# Patient Record
Sex: FEMALE | Race: WHITE | NOT HISPANIC OR LATINO | Employment: UNEMPLOYED | URBAN - METROPOLITAN AREA
[De-identification: names, ages, dates, MRNs, and addresses within clinical notes are randomized per-mention and may not be internally consistent; named-entity substitution may affect disease eponyms.]

---

## 2019-06-26 ENCOUNTER — APPOINTMENT (OUTPATIENT)
Dept: GENERAL RADIOLOGY | Facility: HOSPITAL | Age: 29
End: 2019-06-26

## 2019-06-26 ENCOUNTER — HOSPITAL ENCOUNTER (EMERGENCY)
Facility: HOSPITAL | Age: 29
Discharge: HOME | End: 2019-06-27
Attending: EMERGENCY MEDICINE | Admitting: EMERGENCY MEDICINE

## 2019-06-26 VITALS
TEMPERATURE: 98.3 F | SYSTOLIC BLOOD PRESSURE: 132 MMHG | OXYGEN SATURATION: 99 % | WEIGHT: 216.05 LBS | RESPIRATION RATE: 17 BRPM | DIASTOLIC BLOOD PRESSURE: 70 MMHG | HEART RATE: 76 BPM | HEIGHT: 67 IN | BODY MASS INDEX: 33.91 KG/M2

## 2019-06-26 DIAGNOSIS — R07.9 CHEST PAIN, UNSPECIFIED TYPE: Primary | ICD-10-CM

## 2019-06-26 LAB
ANION GAP SERPL CALCULATED.3IONS-SCNC: 12.5 MMOL/L (ref 10–20)
BASOPHILS # BLD AUTO: 0.1 10*3/MM3 (ref 0–0.2)
BASOPHILS NFR BLD AUTO: 0.9 % (ref 0–1.5)
BUN BLD-MCNC: 10 MG/DL (ref 8–20)
BUN/CREAT SERPL: 14.3 (ref 5.4–26.2)
CALCIUM SPEC-SCNC: 8.6 MG/DL (ref 8.9–10.3)
CHLORIDE SERPL-SCNC: 103 MMOL/L (ref 101–111)
CO2 SERPL-SCNC: 23 MMOL/L (ref 22–32)
CREAT BLD-MCNC: 0.7 MG/DL (ref 0.4–1)
DEPRECATED RDW RBC AUTO: 42.4 FL (ref 37–54)
EOSINOPHIL # BLD AUTO: 0.3 10*3/MM3 (ref 0–0.4)
EOSINOPHIL NFR BLD AUTO: 4.7 % (ref 0.3–6.2)
ERYTHROCYTE [DISTWIDTH] IN BLOOD BY AUTOMATED COUNT: 12.9 % (ref 12.3–15.4)
GFR SERPL CREATININE-BSD FRML MDRD: 98 ML/MIN/1.73
GLUCOSE BLD-MCNC: 85 MG/DL (ref 65–99)
HCT VFR BLD AUTO: 40.3 % (ref 34–46.6)
HGB BLD-MCNC: 13.6 G/DL (ref 12–15.9)
HOLD SPECIMEN: NORMAL
HOLD SPECIMEN: NORMAL
LYMPHOCYTES # BLD AUTO: 2.7 10*3/MM3 (ref 0.7–3.1)
LYMPHOCYTES NFR BLD AUTO: 37.7 % (ref 19.6–45.3)
MCH RBC QN AUTO: 31.6 PG (ref 26.6–33)
MCHC RBC AUTO-ENTMCNC: 33.7 G/DL (ref 31.5–35.7)
MCV RBC AUTO: 93.7 FL (ref 79–97)
MONOCYTES # BLD AUTO: 0.6 10*3/MM3 (ref 0.1–0.9)
MONOCYTES NFR BLD AUTO: 8.6 % (ref 5–12)
NEUTROPHILS # BLD AUTO: 3.5 10*3/MM3 (ref 1.7–7)
NEUTROPHILS NFR BLD AUTO: 48.1 % (ref 42.7–76)
NRBC BLD AUTO-RTO: 0.1 /100 WBC (ref 0–0.2)
PLATELET # BLD AUTO: 365 10*3/MM3 (ref 140–450)
PMV BLD AUTO: 8.9 FL (ref 6–12)
POTASSIUM BLD-SCNC: 3.5 MMOL/L (ref 3.6–5.1)
RBC # BLD AUTO: 4.3 10*6/MM3 (ref 3.77–5.28)
SODIUM BLD-SCNC: 135 MMOL/L (ref 136–144)
TROPONIN I SERPL-MCNC: <0.03 NG/ML (ref 0–0.03)
WBC NRBC COR # BLD: 7.3 10*3/MM3 (ref 3.4–10.8)
WHOLE BLOOD HOLD SPECIMEN: NORMAL
WHOLE BLOOD HOLD SPECIMEN: NORMAL

## 2019-06-26 PROCEDURE — 99284 EMERGENCY DEPT VISIT MOD MDM: CPT

## 2019-06-26 PROCEDURE — 84484 ASSAY OF TROPONIN QUANT: CPT | Performed by: EMERGENCY MEDICINE

## 2019-06-26 PROCEDURE — 93005 ELECTROCARDIOGRAM TRACING: CPT | Performed by: EMERGENCY MEDICINE

## 2019-06-26 PROCEDURE — 80048 BASIC METABOLIC PNL TOTAL CA: CPT | Performed by: EMERGENCY MEDICINE

## 2019-06-26 PROCEDURE — 71045 X-RAY EXAM CHEST 1 VIEW: CPT

## 2019-06-26 PROCEDURE — 85025 COMPLETE CBC W/AUTO DIFF WBC: CPT | Performed by: EMERGENCY MEDICINE

## 2019-06-26 RX ORDER — SODIUM CHLORIDE 0.9 % (FLUSH) 0.9 %
10 SYRINGE (ML) INJECTION AS NEEDED
Status: DISCONTINUED | OUTPATIENT
Start: 2019-06-26 | End: 2019-06-27 | Stop reason: HOSPADM

## 2019-08-07 PROCEDURE — 87086 URINE CULTURE/COLONY COUNT: CPT | Performed by: EMERGENCY MEDICINE

## 2019-08-09 ENCOUNTER — TELEPHONE (OUTPATIENT)
Dept: URGENT CARE | Facility: CLINIC | Age: 29
End: 2019-08-09

## 2019-08-09 NOTE — TELEPHONE ENCOUNTER
I left a message for the patient to call about the urine culture. Per Dr Rizo, the lab states the sample is growing multiple organisms and it was not a clean enough sample. It symptoms persist we will need her to come in to re-collect another sample for culture.

## 2019-08-09 NOTE — TELEPHONE ENCOUNTER
Patient returned call, informed of above. Pt states still with sx and will return today for additional culture.

## 2022-06-19 ENCOUNTER — HOSPITAL ENCOUNTER (EMERGENCY)
Facility: HOSPITAL | Age: 32
Discharge: HOME | End: 2022-06-19
Attending: EMERGENCY MEDICINE | Admitting: EMERGENCY MEDICINE

## 2022-06-19 VITALS
BODY MASS INDEX: 30.38 KG/M2 | HEART RATE: 80 BPM | DIASTOLIC BLOOD PRESSURE: 71 MMHG | OXYGEN SATURATION: 97 % | RESPIRATION RATE: 20 BRPM | TEMPERATURE: 97.7 F | HEIGHT: 67 IN | WEIGHT: 193.56 LBS | SYSTOLIC BLOOD PRESSURE: 126 MMHG

## 2022-06-19 DIAGNOSIS — F41.0 PANIC ATTACK: Primary | ICD-10-CM

## 2022-06-19 PROCEDURE — 99283 EMERGENCY DEPT VISIT LOW MDM: CPT

## 2022-06-19 PROCEDURE — 93005 ELECTROCARDIOGRAM TRACING: CPT | Performed by: EMERGENCY MEDICINE

## 2022-06-19 PROCEDURE — 93005 ELECTROCARDIOGRAM TRACING: CPT

## 2022-06-19 RX ORDER — LORAZEPAM 1 MG/1
1 TABLET ORAL ONCE
Status: COMPLETED | OUTPATIENT
Start: 2022-06-19 | End: 2022-06-19

## 2022-06-19 RX ADMIN — LORAZEPAM 1 MG: 1 TABLET ORAL at 12:47

## 2022-06-19 NOTE — ED PROVIDER NOTES
"Subjective   Chief complaint: Panic attack    32-year-old female presents stating she is having a panic attack.  Patient states symptoms started about 2 hours prior to arrival.  She states she was seen in the emergency room in [place] 4 days ago for the same complaint.  She had an extensive chest pain work-up at that time which was unremarkable.  Her symptoms improved with Ativan and she was discharged.  She states she has the same symptoms today.  She is having a tightness feeling in her chest.  She states she is tingling all over.  She feels like she cannot get a good breath.  Patient states she has a long history of anxiety and is on Zoloft, hydroxyzine, Xanax.  She also sees a counselor every other week.  She took her Xanax today with no improvement.  She denies any specific trigger.  She denies any alleviating or exacerbating factors.      History provided by:  Patient      Review of Systems   Constitutional: Negative for fever.   HENT: Negative for congestion and sore throat.    Eyes: Negative for redness.   Respiratory: Positive for shortness of breath. Negative for cough.    Cardiovascular: Positive for chest pain.   Gastrointestinal: Negative for abdominal pain, diarrhea and vomiting.   Genitourinary: Negative for dysuria.   Musculoskeletal: Negative for back pain.   Skin: Negative for rash.   Neurological: Negative for dizziness and headaches.   Psychiatric/Behavioral: Negative for confusion. The patient is nervous/anxious.        No past medical history on file.    No Known Allergies    No past surgical history on file.    No family history on file.    Social History     Socioeconomic History   • Marital status:        /71   Pulse 80   Temp 97.7 °F (36.5 °C) (Oral)   Resp 20   Ht 170.2 cm (67\")   Wt 87.8 kg (193 lb 9 oz)   LMP 06/19/2022   SpO2 97%   BMI 30.32 kg/m²       Objective   Physical Exam  Vitals and nursing note reviewed.   Constitutional:       Appearance: She is " well-developed.   HENT:      Head: Normocephalic and atraumatic.   Eyes:      Pupils: Pupils are equal, round, and reactive to light.   Cardiovascular:      Rate and Rhythm: Normal rate and regular rhythm.      Heart sounds: Normal heart sounds.   Pulmonary:      Effort: Pulmonary effort is normal. No respiratory distress.      Breath sounds: Normal breath sounds.   Abdominal:      General: Bowel sounds are normal.      Palpations: Abdomen is soft.      Tenderness: There is no abdominal tenderness.   Musculoskeletal:         General: Normal range of motion.      Cervical back: Normal range of motion and neck supple.   Skin:     General: Skin is warm and dry.   Neurological:      General: No focal deficit present.      Mental Status: She is alert and oriented to person, place, and time.   Psychiatric:         Mood and Affect: Mood is anxious.         Procedures           ED Course      My interpretation of EKG shows sinus rhythm, rate of 70, no ST elevation                                           MDM   Patient was given Ativan and she is feeling much better.  Her symptoms are completely gone.  I reviewed records from her recent ER visit in [place] 4 days ago.  Patient had an extensive work-up at that time which was unremarkable.  Patient feels the symptoms are due to anxiety and panic attacks and I agree with this.  I do not feel further work-up is warranted at this time and patient agrees.  She will be discharged and she will follow-up with her primary doctor for ongoing management of her anxiety.      Final diagnoses:   Panic attack       ED Disposition  ED Disposition     ED Disposition   Discharge    Condition   Stable    Comment   --             No follow-up provider specified.       Medication List      No changes were made to your prescriptions during this visit.          Harsha Chandler MD  06/19/22 7146

## 2022-06-20 LAB — QT INTERVAL: 385 MS

## 2024-07-12 ENCOUNTER — APPOINTMENT (OUTPATIENT)
Dept: GENERAL RADIOLOGY | Facility: HOSPITAL | Age: 34
End: 2024-07-12
Payer: MEDICAID

## 2024-07-12 ENCOUNTER — HOSPITAL ENCOUNTER (EMERGENCY)
Facility: HOSPITAL | Age: 34
Discharge: HOME | End: 2024-07-12
Payer: MEDICAID

## 2024-07-12 VITALS
RESPIRATION RATE: 18 BRPM | DIASTOLIC BLOOD PRESSURE: 80 MMHG | WEIGHT: 200 LBS | SYSTOLIC BLOOD PRESSURE: 132 MMHG | OXYGEN SATURATION: 95 % | TEMPERATURE: 98.2 F | BODY MASS INDEX: 31.39 KG/M2 | HEIGHT: 67 IN | HEART RATE: 95 BPM

## 2024-07-12 DIAGNOSIS — M25.561 ACUTE PAIN OF RIGHT KNEE: ICD-10-CM

## 2024-07-12 DIAGNOSIS — S83.91XA SPRAIN OF RIGHT KNEE, UNSPECIFIED LIGAMENT, INITIAL ENCOUNTER: Primary | ICD-10-CM

## 2024-07-12 PROCEDURE — 99283 EMERGENCY DEPT VISIT LOW MDM: CPT

## 2024-07-12 PROCEDURE — 73562 X-RAY EXAM OF KNEE 3: CPT

## 2024-07-12 RX ORDER — DICLOFENAC SODIUM 75 MG/1
75 TABLET, DELAYED RELEASE ORAL 2 TIMES DAILY PRN
Qty: 20 TABLET | Refills: 0 | Status: SHIPPED | OUTPATIENT
Start: 2024-07-12

## 2024-07-12 RX ORDER — ACETAMINOPHEN 500 MG
1000 TABLET ORAL ONCE
Status: COMPLETED | OUTPATIENT
Start: 2024-07-12 | End: 2024-07-12

## 2024-07-12 RX ORDER — IBUPROFEN 600 MG/1
600 TABLET ORAL ONCE
Status: COMPLETED | OUTPATIENT
Start: 2024-07-12 | End: 2024-07-12

## 2024-07-12 RX ADMIN — IBUPROFEN 600 MG: 600 TABLET, FILM COATED ORAL at 21:23

## 2024-07-12 RX ADMIN — ACETAMINOPHEN 1000 MG: 500 TABLET, FILM COATED ORAL at 21:23

## 2024-07-12 NOTE — Clinical Note
[facility] EMERGENCY DEPARTMENT  [address] [place] [address]  Phone: [phone]    Mechelle Pugh was seen and treated in our emergency department on 7/12/2024.  She may return to work on 07/14/2024.         Thank you for choosing [facility].    Ira Caldwell, APRN

## 2024-07-13 NOTE — DISCHARGE INSTRUCTIONS
Wear knee immobilizer and use crutches until pain-free if still painful in 10 days see orthopedics for further evaluation and treatment    Voltaren as needed for inflammation and pain do not mix with Motrin ibuprofen Advil or Aleve    You can use Tylenol with the Voltaren for pain control.

## 2024-07-13 NOTE — ED PROVIDER NOTES
Subjective   History of Present Illness  Patient is a 34-year-old obese female who was at a water park today and fell out of the tube and smacked her right knee on the water slide and is having pain since that time.  She states she placed ice and the pain became worse and she is unable to bear weight at this time.  She has no numbness no tingling she did not strike her head she did not lose consciousness.      Review of Systems   Musculoskeletal:  Positive for joint swelling.        Right knee pain       No past medical history on file.    Allergies   Allergen Reactions    Penicillins Other (See Comments)    Diazepam Other (See Comments)       No past surgical history on file.    No family history on file.    Social History     Socioeconomic History    Marital status:            Objective   Physical Exam  Vitals reviewed.   Constitutional:       Appearance: She is well-developed.   HENT:      Head: Normocephalic and atraumatic.   Eyes:      Conjunctiva/sclera: Conjunctivae normal.      Pupils: Pupils are equal, round, and reactive to light.   Cardiovascular:      Rate and Rhythm: Normal rate and regular rhythm.      Heart sounds: Normal heart sounds.   Pulmonary:      Effort: Pulmonary effort is normal.   Abdominal:      Palpations: Abdomen is soft.   Musculoskeletal:         General: No deformity. Normal range of motion.      Cervical back: Normal range of motion and neck supple.      Right knee: Swelling present. No erythema or ecchymosis. Decreased range of motion. Tenderness present.      Instability Tests: Anterior drawer test positive. Posterior drawer test positive.      Left knee: Normal.   Skin:     General: Skin is warm and dry.   Neurological:      General: No focal deficit present.      Mental Status: She is alert and oriented to person, place, and time.      GCS: GCS eye subscore is 4. GCS verbal subscore is 5. GCS motor subscore is 6.      Motor: No weakness.   Psychiatric:         Mood and  "Affect: Mood normal.         Behavior: Behavior normal.         Procedures       Patient was placed in a knee immobilizer and given crutches and crutch training by the nursing staff.  Patient was distally neurovascularly intact  ED Course                                   /80   Pulse 95   Temp 98.2 °F (36.8 °C)   Resp 18   Ht 170.2 cm (67\")   Wt 90.7 kg (200 lb)   SpO2 95%   BMI 31.32 kg/m²   Labs Reviewed - No data to display  Medications   acetaminophen (TYLENOL) tablet 1,000 mg (has no administration in time range)   ibuprofen (ADVIL,MOTRIN) tablet 600 mg (has no administration in time range)     XR Knee 3 View Right    Result Date: 7/12/2024  1.No evidence for displaced fracture or dislocation. Electronically Signed: Nimesh Martinez MD  7/12/2024 7:38 PM EDT  Workstation ID: [id number]             Medical Decision Making  Patient had above exam and x-ray was obtained and reviewed and interpreted by the radiologist and reviewed by myself as essentially normal with no fracture.  The patient was placed in a knee immobilizer and given crutches and crutch training by the nursing staff she was given some Tylenol and Motrin for pain here in the emergency room she was instructed on the use of Voltaren at home and to follow-up with orthopedics she states she has an appointment on the 17th she was encouraged to have them look at her knee as well she verbalized understood discharge instructions    Problems Addressed:  Acute pain of right knee: complicated acute illness or injury  Sprain of right knee, unspecified ligament, initial encounter: complicated acute illness or injury    Amount and/or Complexity of Data Reviewed  Radiology: independent interpretation performed. Decision-making details documented in ED Course.  ECG/medicine tests: ordered and independent interpretation performed. Decision-making details documented in ED Course.    Risk  OTC drugs.  Prescription drug management.        Final diagnoses: "   Sprain of right knee, unspecified ligament, initial encounter   Acute pain of right knee       ED Disposition  ED Disposition       ED Disposition   Discharge    Condition   Stable    Comment   --               Fredy Fuentes MD  [address]  [place] [address]  [phone]    In 3 days  If symptoms worsen, As needed    Cesar Churchill MD  [address] [place] [address]  [phone]    In 3 days  If symptoms worsen, As needed    Rishi Godinez II, MD  [address] [facility] [place] [address]  [phone]    In 3 days  If symptoms worsen, As needed         Medication List        New Prescriptions      diclofenac 75 MG EC tablet  Commonly known as: VOLTAREN  Take 1 tablet by mouth 2 (Two) Times a Day As Needed (pain).            Stop      nitrofurantoin (macrocrystal-monohydrate) 100 MG capsule  Commonly known as: MACROBID               Where to Get Your Medications        These medications were sent to [id number] - [place], [place] - [address] AT [facility] - [phone] [facility] [phone]   [address] [facility] [address]      Phone: [phone]   diclofenac 75 MG EC tablet            Ira Caldwell, APRN  07/12/24 4110

## 2024-12-09 ENCOUNTER — OFFICE VISIT (OUTPATIENT)
Dept: FAMILY MEDICINE CLINIC | Facility: CLINIC | Age: 34
End: 2024-12-09
Payer: MEDICAID

## 2024-12-09 VITALS
HEIGHT: 67 IN | OXYGEN SATURATION: 97 % | BODY MASS INDEX: 32.87 KG/M2 | DIASTOLIC BLOOD PRESSURE: 68 MMHG | WEIGHT: 209.4 LBS | SYSTOLIC BLOOD PRESSURE: 112 MMHG | HEART RATE: 110 BPM

## 2024-12-09 DIAGNOSIS — F17.200 CURRENT EVERY DAY SMOKER: ICD-10-CM

## 2024-12-09 DIAGNOSIS — M22.42 CHONDROMALACIA OF BOTH PATELLAE: ICD-10-CM

## 2024-12-09 DIAGNOSIS — Z23 NEED FOR COVID-19 VACCINE: ICD-10-CM

## 2024-12-09 DIAGNOSIS — Z23 NEED FOR INFLUENZA VACCINATION: ICD-10-CM

## 2024-12-09 DIAGNOSIS — A51.49 SECONDARY SYPHILIS: ICD-10-CM

## 2024-12-09 DIAGNOSIS — F33.1 MODERATE EPISODE OF RECURRENT MAJOR DEPRESSIVE DISORDER: ICD-10-CM

## 2024-12-09 DIAGNOSIS — Z71.6 ENCOUNTER FOR SMOKING CESSATION COUNSELING: ICD-10-CM

## 2024-12-09 DIAGNOSIS — E66.811 CLASS 1 OBESITY DUE TO EXCESS CALORIES WITH SERIOUS COMORBIDITY AND BODY MASS INDEX (BMI) OF 32.0 TO 32.9 IN ADULT: ICD-10-CM

## 2024-12-09 DIAGNOSIS — F43.10 PTSD (POST-TRAUMATIC STRESS DISORDER): ICD-10-CM

## 2024-12-09 DIAGNOSIS — Z76.89 ENCOUNTER TO ESTABLISH CARE: Primary | ICD-10-CM

## 2024-12-09 DIAGNOSIS — E66.09 CLASS 1 OBESITY DUE TO EXCESS CALORIES WITH SERIOUS COMORBIDITY AND BODY MASS INDEX (BMI) OF 32.0 TO 32.9 IN ADULT: ICD-10-CM

## 2024-12-09 DIAGNOSIS — F41.1 GAD (GENERALIZED ANXIETY DISORDER): ICD-10-CM

## 2024-12-09 DIAGNOSIS — M22.41 CHONDROMALACIA OF BOTH PATELLAE: ICD-10-CM

## 2024-12-09 DIAGNOSIS — Z22.7 TB LUNG, LATENT: ICD-10-CM

## 2024-12-09 DIAGNOSIS — B97.7 HPV IN FEMALE: ICD-10-CM

## 2024-12-09 PROBLEM — N83.209 CYST OF OVARY: Status: ACTIVE | Noted: 2022-06-23

## 2024-12-09 PROBLEM — F41.0 PANIC DISORDER: Status: ACTIVE | Noted: 2024-12-09

## 2024-12-09 PROBLEM — F41.0 PANIC DISORDER WITHOUT AGORAPHOBIA WITH SEVERE PANIC ATTACKS: Status: ACTIVE | Noted: 2021-12-26

## 2024-12-09 PROBLEM — F90.2 ATTENTION DEFICIT HYPERACTIVITY DISORDER, COMBINED TYPE: Status: ACTIVE | Noted: 2023-08-15

## 2024-12-09 PROBLEM — F31.12: Status: ACTIVE | Noted: 2023-08-15

## 2024-12-09 PROCEDURE — 90480 ADMN SARSCOV2 VAC 1/ONLY CMP: CPT

## 2024-12-09 PROCEDURE — 1160F RVW MEDS BY RX/DR IN RCRD: CPT

## 2024-12-09 PROCEDURE — 99204 OFFICE O/P NEW MOD 45 MIN: CPT

## 2024-12-09 PROCEDURE — 90471 IMMUNIZATION ADMIN: CPT

## 2024-12-09 PROCEDURE — 90656 IIV3 VACC NO PRSV 0.5 ML IM: CPT

## 2024-12-09 PROCEDURE — 91320 SARSCV2 VAC 30MCG TRS-SUC IM: CPT

## 2024-12-09 PROCEDURE — 1159F MED LIST DOCD IN RCRD: CPT

## 2024-12-09 RX ORDER — DESVENLAFAXINE 50 MG/1
100 TABLET, FILM COATED, EXTENDED RELEASE ORAL DAILY
COMMUNITY
End: 2024-12-09 | Stop reason: SDUPTHER

## 2024-12-09 RX ORDER — VARENICLINE TARTRATE 1 MG/1
1 TABLET, FILM COATED ORAL 2 TIMES DAILY
Qty: 56 TABLET | Refills: 1 | Status: SHIPPED | OUTPATIENT
Start: 2025-01-06 | End: 2024-12-13 | Stop reason: SDUPTHER

## 2024-12-09 RX ORDER — DESVENLAFAXINE 50 MG/1
100 TABLET, FILM COATED, EXTENDED RELEASE ORAL DAILY
Qty: 90 TABLET | Refills: 0 | Status: SHIPPED | OUTPATIENT
Start: 2024-12-09

## 2024-12-09 RX ORDER — NORGESTIMATE AND ETHINYL ESTRADIOL 7DAYSX3 28
1 KIT ORAL DAILY
COMMUNITY
Start: 2022-05-08

## 2024-12-09 NOTE — PATIENT INSTRUCTIONS

## 2024-12-09 NOTE — PROGRESS NOTES
Subjective   Mechelle Pugh is a 34 y.o. female.     Chief Complaint   Patient presents with    [facility]    referral to psych     History of Present Illness  The patient is a 34-year-old female who presents for evaluation of multiple medical concerns.    Anxiety, Depression, and Panic Disorder  She is currently on Pristiq for anxiety, depression, and severe panic disorder. Despite her efforts to adhere to the medication regimen, she occasionally misses doses due to technical issues. When off the medication for two days, she experiences lethargy and a lack of motivation. She describes her depression as mild to moderate, but her panic disorder is severe. She was previously diagnosed with ADHD and prescribed medication, but discontinued it due to side effects. She also has a history of bipolar disorder, which led to a period of suicidal ideation and a diagnosis at [facility] in 2023. She has been attending [facility] weekly for the past seven months and is now transitioning to bi-weekly visits. She has been off her medication for two weeks and is seeking a referral to a psychiatrist at [facility]. She reports feeling down, depressed, or hopeless more than half the time, sleeping excessively, feeling tired, having a poor appetite, and feeling like a failure. She does not endorse any thoughts of self-harm or suicide.  - Onset: When off medication for two days.  - Duration: Off medication for two weeks.  - Character: Lethargy, lack of motivation, mild to moderate depression, severe panic disorder.  - Alleviating/Aggravating Factors: Adherence to medication regimen, technical issues causing missed doses.  - Severity: Severe panic disorder, mild to moderate depression.    History of Ovarian Cysts and HPV  She has a history of ovarian cysts, one of which ruptured and required a D & C. She was diagnosed with HPV in her early 20s, but recent Pap smears have been normal. She  has never had a mammogram but is interested in getting one due to her family history of breast cancer. She has a fear of death, which she believes contributes to her severe pain disorder.  - Onset: Early 20s for HPV diagnosis.  - Character: Ovarian cysts, ruptured cyst requiring D & C, normal recent Pap smears.  - Severity: Fear of death contributing to severe pain disorder.    Smoking Cessation  She was previously on Chantix to quit smoking, which she found effective. However, she lost the medication and resumed smoking. She is interested in restarting Chantix as it helped her lose the desire to smoke.  - Onset: Resumed smoking after losing Chantix medication.  - Character: Effective smoking cessation with Chantix.  - Alleviating/Aggravating Factors: Loss of medication led to resumption of smoking.    Chondromalacia and Secondary Syphilis  She is taking birth control pills. She is not taking diclofenac. She has chondromalacia of both knees and was going to [facility] for that. She has secondary syphilis and was given a penicillin injection. She [facility] the [facility] times for chest x-rays for TB and syphilis. Her levels were dropping and everything looked great. As of right now, when she tests, it does not show up. She thinks it is undetectable.  - Onset: Diagnosed with secondary syphilis.  - Character: Chondromalacia of both knees, secondary syphilis.  - Alleviating/Aggravating Factors: Penicillin injection for syphilis, chest x-rays for TB and syphilis.  - Severity: Syphilis levels dropping, currently undetectable.    SOCIAL HISTORY  - Smoking a pack a day    FAMILY HISTORY  - Grandmother had breast cancer and had to remove her breast  - Mother had breast cancer  - Aunts and uncles have had breast cancer  - Cousin  at 21    The following portions of the patient's history were reviewed and updated as appropriate: allergies, current medications, past family history, past medical history, past  social history, past surgical history and problem list.    Results  - Laboratory Studies:    - Pap smear in June was negative for HPV, Chlamydia, gonorrhea, trichomoniasis    Objective     Vitals:    12/09/24 1257   BP: 112/68   Pulse: 110   SpO2: 97%      Body mass index is 32.8 kg/m².    Physical Exam  Vitals reviewed.   Constitutional:       Appearance: Normal appearance. She is obese.   Cardiovascular:      Rate and Rhythm: Tachycardia present.   Neurological:      Mental Status: She is alert.       Assessment & Plan  1. Anxiety  - Taking Pristiq for anxiety  - Referral to psychiatry for further management  - Prescription for Pristiq until psychiatry appointment    2. Depression  - Describes depression as mild to moderate, managed with Pristiq  - Referral to psychiatry for further management  - Prescription for Pristiq until psychiatry appointment    3. ADHD  - Diagnosed with ADHD, previously tried medication but discontinued due to side effects  - Referral to psychiatry for further management    4. Bipolar Disorder  - Diagnosed with bipolar disorder, hospitalized in May 2023  - Referral to psychiatry for further management    5. Panic Disorder  - Reports severe panic disorder  - Referral to psychiatry for further management    6. PTSD  - Diagnosed with PTSD  - Referral to psychiatry for further management    7. Ovarian Cysts  - Reports having ovarian cysts that have ruptured in the past  - Advised to monitor for changes and inform if abnormalities persist for more than a month or increase in size    8. Chondromalacia of Both Knees  - Diagnosed with chondromalacia of both knees  - Going to [facility] for treatment    9. Secondary Syphilis  - Treated for secondary syphilis with penicillin shots  - Followed up at the health department, levels currently undetectable    10. Tuberculosis  - Reports having TB of the lung  - Followed up at the health department    11. HPV  - Diagnosed with HPV in early 20s  - Pap  smear in June 2024 negative for HPV, Chlamydia, gonorrhea, and trichomoniasis    12. Increased Risk of Breast Cancer  - Family history of breast cancer  - Advised to perform self-breast exams and monitor for changes  - Inform if abnormalities persist for more than a month or increase in size for mammogram    13. Smoking Cessation  - Prescription for Chantix 1 mg twice a day  - Reports Chantix previously helped reduce smoking    14. Health Maintenance  - Will receive influenza and COVID-19 vaccines today  - Annual wellness physical visit to be scheduled, blood work if necessary    Follow-up  - Return in 3 months for a physical     Discussed Care Gaps, ordered referrals and encouraged vaccination updates.       - Pt agrees with plan of care and denies further questions/concerns today  - This document is intended for medical expert use only. Persons  reading this document without medical staff guidance may result in misinterpretation and unintended morbidity     Go to the ER for any possible life-threatening symptoms such as chest pain or shortness of air.      Please allow 3-5 business days for recommendations based on new results      I personally spent time with this patient, preparing for the visit, reviewing tests, obtaining and/or reviewing a separately obtained history, performing a medically appropriate examination and/or evaluation, counseling and educating the patient/family/caregiver, ordering medications,  documenting information in the medical record and indepentently interpreting results.       Patient or patient representative verbalized consent for the use of Ambient Listening during the visit with  BRODY Nicole for chart documentation. 12/9/2024  13:39 EST

## 2024-12-10 ENCOUNTER — PATIENT ROUNDING (BHMG ONLY) (OUTPATIENT)
Dept: FAMILY MEDICINE CLINIC | Facility: CLINIC | Age: 34
End: 2024-12-10
Payer: MEDICAID

## 2024-12-10 ENCOUNTER — TELEPHONE (OUTPATIENT)
Dept: FAMILY MEDICINE CLINIC | Facility: CLINIC | Age: 34
End: 2024-12-10
Payer: MEDICAID

## 2024-12-10 NOTE — TELEPHONE ENCOUNTER
Patient stopped in office to see about her arm.  She works at [employer] and is having trouble using her arm.    Please call patient to discuss

## 2024-12-10 NOTE — TELEPHONE ENCOUNTER
Ramonita is out of the office today. Per Christa, use a warm compress and can take tylenol for pain. The leg has nothing to do with the shot.

## 2024-12-10 NOTE — TELEPHONE ENCOUNTER
PATIENT CALLED AND STATES SHE RECEIVED A COVID VACCINE YESTERDAY. TODAY SHE STATES SHE IS HAVING RIGHT LEG PAIN WHEN SHE WALKS, WITH ANY MOVEMENT.  SHE RECEIVED COVID VACCINE IN RIGHT ARM.    CALL BACK NUMBER [phone]

## 2024-12-10 NOTE — TELEPHONE ENCOUNTER
Pt informed and said she got worried that she may have a blood clot in her right leg so she went down the street to [facility]. I told her to keep us informed if they find something wrong.

## 2024-12-10 NOTE — PROGRESS NOTES
A [employer] message has been sent to the patient for PATIENT ROUNDING with [facility].

## 2024-12-13 ENCOUNTER — TELEPHONE (OUTPATIENT)
Dept: FAMILY MEDICINE CLINIC | Facility: CLINIC | Age: 34
End: 2024-12-13
Payer: MEDICAID

## 2024-12-13 DIAGNOSIS — Z71.6 ENCOUNTER FOR SMOKING CESSATION COUNSELING: ICD-10-CM

## 2024-12-13 DIAGNOSIS — F17.200 CURRENT EVERY DAY SMOKER: ICD-10-CM

## 2024-12-13 RX ORDER — VARENICLINE TARTRATE 1 MG/1
1 TABLET, FILM COATED ORAL 2 TIMES DAILY
Qty: 56 TABLET | Refills: 1 | Status: SHIPPED | OUTPATIENT
Start: 2024-12-13

## 2024-12-13 NOTE — TELEPHONE ENCOUNTER
Caller: Mechelle Pugh    Relationship: Self    Best call back number: [phone]     Which medication are you concerned about: VARENICLINE      What are your concerns: PATIENT STATES PRESCRIPTION WAS SENT IN WITH A START DATE OF 01/09/25, SO SHE COULD NOT GET IT FILLED. IS REQUESTING A NEW PRESCRIPTION BE SENT WITH A START DATE OF 12/13.

## 2024-12-16 ENCOUNTER — OFFICE VISIT (OUTPATIENT)
Dept: FAMILY MEDICINE CLINIC | Facility: CLINIC | Age: 34
End: 2024-12-16
Payer: MEDICAID

## 2024-12-16 VITALS
OXYGEN SATURATION: 97 % | DIASTOLIC BLOOD PRESSURE: 76 MMHG | SYSTOLIC BLOOD PRESSURE: 108 MMHG | BODY MASS INDEX: 33.12 KG/M2 | HEART RATE: 85 BPM | WEIGHT: 211 LBS | HEIGHT: 67 IN

## 2024-12-16 DIAGNOSIS — I83.93 ASYMPTOMATIC VARICOSE VEINS OF BOTH LOWER EXTREMITIES: Primary | ICD-10-CM

## 2024-12-16 PROCEDURE — 99213 OFFICE O/P EST LOW 20 MIN: CPT

## 2024-12-16 PROCEDURE — 1160F RVW MEDS BY RX/DR IN RCRD: CPT

## 2024-12-16 PROCEDURE — 1159F MED LIST DOCD IN RCRD: CPT

## 2024-12-16 NOTE — PROGRESS NOTES
Subjective   Mechelle Pugh is a 34 y.o. female.     Chief Complaint   Patient presents with    right leg pain     Better now but has a varicose vein there     History of Present Illness  The patient is a 34-year-old female who presents for evaluation of leg pain.    Leg Pain Following Vaccination  She reports experiencing sudden onset of leg pain, which she attributes to receiving her first COVID-19 vaccine and influenza vaccine the previous day. The pain was severe enough to cause limping but resolved spontaneously the following day. She also noticed a lump in her arm, which has since disappeared. She sought emergency care due to these symptoms but was advised to schedule a follow-up appointment.  - Onset: Sudden onset following COVID-19 and influenza vaccines the previous day.  - Location: Leg.  - Duration: Resolved spontaneously the following day.  - Character: Severe pain causing limping.  - Severity: Severe enough to cause limping.    Varicose Veins  She has a history of varicose veins, with one vein being particularly prominent and prone to protrusion under pressure. This vein was previously injured by a dog tail, resulting in a large blood blister and subsequent pain. She does not use compression socks but is aware of their potential benefits.  - Onset: History of varicose veins; previous injury by a dog tail.  - Location: Prominent vein prone to protrusion.  - Character: Large blood blister and subsequent pain from previous injury.  - Alleviating/Aggravating Factors: Does not use compression socks but is aware of their potential benefits.    She is due for a syphilis checkup next month.    IMMUNIZATIONS  - COVID-19 vaccine: First dose received last week  - Influenza vaccine: Received last week    The following portions of the patient's history were reviewed and updated as appropriate: allergies, current medications, past family history, past medical history, past social history, past surgical history  and problem list.    Results       Objective     Vitals:    12/16/24 1311   BP: 108/76   Pulse: 85   SpO2: 97%      Body mass index is 33.05 kg/m².    Physical Exam  Vitals reviewed.   Constitutional:       Appearance: Normal appearance. She is obese.   HENT:      Right Ear: External ear normal.      Left Ear: External ear normal.   Eyes:      Conjunctiva/sclera: Conjunctivae normal.   Cardiovascular:      Rate and Rhythm: Normal rate.   Pulmonary:      Effort: Pulmonary effort is normal.   Musculoskeletal:         General: Normal range of motion.   Skin:     General: Skin is warm and dry.   Neurological:      Mental Status: She is alert and oriented to person, place, and time.   Psychiatric:         Behavior: Behavior normal.         Assessment & Plan  1. Leg pain  - Etiology likely multifactorial (varicose veins and muscle cramps)  - Pain has resolved  - Advise use of compression socks to improve circulation and prevent varicose veins  - Elevate legs whenever possible to aid in symptom management  - Notify clinic if pain recurs or worsens    2. Syphilis screening  - Syphilis screening test to be ordered for next month  - Reminder via [employer] to visit the lab for the test    Follow-up  - Patient to follow up in 2 to 3 months for a physical examination     Discussed Care Gaps, ordered referrals and encouraged vaccination updates.       - Pt agrees with plan of care and denies further questions/concerns today  - This document is intended for medical expert use only. Persons  reading this document without medical staff guidance may result in misinterpretation and unintended morbidity     Go to the ER for any possible life-threatening symptoms such as chest pain or shortness of air.      Please allow 3-5 business days for recommendations based on new results      I personally spent time with this patient, preparing for the visit, reviewing tests, obtaining and/or reviewing a separately obtained history, performing a  medically appropriate examination and/or evaluation, counseling and educating the patient/family/caregiver, ordering medications,  documenting information in the medical record and indepentently interpreting results.       Patient or patient representative verbalized consent for the use of Ambient Listening during the visit with  BRODY Nicole for chart documentation. 12/16/2024  13:20 EST

## 2024-12-16 NOTE — PATIENT INSTRUCTIONS

## 2025-02-03 ENCOUNTER — NURSE TRIAGE (OUTPATIENT)
Dept: CALL CENTER | Facility: HOSPITAL | Age: 35
End: 2025-02-03
Payer: MEDICAID

## 2025-02-03 ENCOUNTER — OFFICE VISIT (OUTPATIENT)
Dept: FAMILY MEDICINE CLINIC | Facility: CLINIC | Age: 35
End: 2025-02-03
Payer: MEDICAID

## 2025-02-03 VITALS
DIASTOLIC BLOOD PRESSURE: 64 MMHG | HEART RATE: 75 BPM | WEIGHT: 219.8 LBS | SYSTOLIC BLOOD PRESSURE: 122 MMHG | HEIGHT: 67 IN | BODY MASS INDEX: 34.5 KG/M2 | OXYGEN SATURATION: 98 %

## 2025-02-03 DIAGNOSIS — I82.811 ACUTE SUPERFICIAL VENOUS THROMBOSIS OF RIGHT LOWER EXTREMITY: Primary | ICD-10-CM

## 2025-02-03 PROCEDURE — 1159F MED LIST DOCD IN RCRD: CPT

## 2025-02-03 PROCEDURE — 1160F RVW MEDS BY RX/DR IN RCRD: CPT

## 2025-02-03 PROCEDURE — 99213 OFFICE O/P EST LOW 20 MIN: CPT

## 2025-02-03 RX ORDER — DROSPIRENONE AND ETHINYL ESTRADIOL 0.02-3(28)
1 KIT ORAL DAILY
COMMUNITY
Start: 2025-01-08 | End: 2025-02-03

## 2025-02-03 NOTE — PROGRESS NOTES
Subjective   Mechelle Pugh is a 34 y.o. female.     Patient or patient representative verbalized consent for the use of Ambient Listening during the visit with  BRODY Nicole for chart documentation. 2/3/2025  17:18 EST    Chief Complaint   Patient presents with    Mass     Knot on right leg under verucose vein above the knee. Noticed about a day and half ago.     History of Present Illness  The patient is a 34-year-old female who presents for evaluation of a lump on her leg.    Lump on Leg  She reports the sudden appearance of a palpable mass on her right mid-thigh approximately 36 hours ago. The mass, described as pebble-like, is located within a varicose vein that has been present since her early 20s. She does not report any associated pain, bruising, or changes in the mass's size or location. Additionally, she does not experience any new onset of shortness of breath, chest pain, numbness, or tingling. She recently recovered from an illness, which she believes may have affected her peripheral circulation due to prolonged periods of rest. She is currently taking Tri-Sprintec for birth control and has completed a course of Chantix. She has no history of thromboembolic events.  - Onset: Approximately 36 hours ago.  - Location: Right mid-thigh within a varicose vein.  - Character: Pebble-like mass.  - Alleviating/Aggravating Factors: Recently recovered from an illness, prolonged periods of rest.  - Severity: No associated pain, bruising, or changes in size or location; no new onset of shortness of breath, chest pain, numbness, or tingling.    Weight Concerns  She expresses concern about her weight and inquires about the possibility of initiating Mounjaro therapy.    MEDICATIONS  - Current: Tri-Sprintec  - Past: Chantix    The following portions of the patient's history were reviewed and updated as appropriate: allergies, current medications, past family history, past medical history, past social history,  past surgical history and problem list.    Results         Objective     Vitals:    02/03/25 1626   BP: 122/64   Pulse: 75   SpO2: 98%      Body mass index is 34.43 kg/m².    Physical Exam  Constitutional:       Appearance: Normal appearance. She is obese.   Cardiovascular:      Rate and Rhythm: Normal rate and regular rhythm.   Pulmonary:      Effort: Pulmonary effort is normal.      Breath sounds: Normal breath sounds.   Musculoskeletal:      Right upper leg: No swelling, edema, deformity, lacerations, tenderness or bony tenderness.      Right lower leg: No edema.      Left lower leg: No edema.        Legs:       Comments: Firm, non-tender pea-sized mass   Skin:     General: Skin is warm and dry.   Neurological:      Mental Status: She is alert.   Psychiatric:         Behavior: Behavior normal.         Assessment & Plan  1. Right thigh lump  - Low likelihood of deep blood clot due to absence of systemic symptoms (warmth, redness, swelling, pain)  - Legs are warm, indicating good circulation  - Advised to monitor the lump for changes over the next week  - If unchanged and causing discomfort, an ultrasound can be ordered upon request via [employer] message  - Can take baby aspirin 81 mg for a week if not on any medication  - Contact office immediately or seek emergency care if new symptoms arise (lump moving upwards, redness, warmth, swelling, pain, sudden difficulty breathing, fever)    2. Weight management  - Mounjaro indicated only for patients with diabetes, which she does not have  - Unlikely insurance will cover weight loss medication without diabetes diagnosis  - Phentermine discussed as a potential option but not preferred due to limitations and side effects  - Compounded semaglutide mentioned as a better alternative, though may not be within budget  - Further discussion of options during upcoming physical examination       Discussed Care Gaps, ordered referrals and encouraged vaccination updates.       - Pt  agrees with plan of care and denies further questions/concerns today  - This document is intended for medical expert use only. Persons  reading this document without medical staff guidance may result in misinterpretation and unintended morbidity     Go to the ER for any possible life-threatening symptoms such as chest pain or shortness of air.      Please allow 3-5 business days for recommendations based on new results      I personally spent time with this patient, preparing for the visit, reviewing tests, obtaining and/or reviewing a separately obtained history, performing a medically appropriate examination and/or evaluation, counseling and educating the patient/family/caregiver, ordering medications,  documenting information in the medical record and indepentently interpreting results.

## 2025-02-03 NOTE — TELEPHONE ENCOUNTER
Knot the size of a marble on right leg on oni.  Can see through leggings.  Has been there for a day and a half.  Has been sick with RSV.  Noticed a color difference in foot.  Purple tint but it could have been how she was sitting.  Foot now has a reddened appearance.  No pain or edema.     Pebble or rock under skin. No swelling.  says it looks like Baker cyst.    Transferred call to office for appointment.        Additional Information   Negative: Serious injury with multiple fractures (broken bones)   Negative: [1] Major bleeding (e.g., actively dripping or spurting) AND [2] can't be stopped   Negative: Bullet wound, stabbed by knife, or other serious penetrating wound   Negative: Looks like a dislocated joint (crooked or deformed)   Negative: Can't stand (bear weight) or walk   Negative: Sounds like a life-threatening emergency to the triager   Negative: Wound looks infected   Negative: Leg pain from overuse (e.g., sports, running, physical work)   Negative: Leg pain not from an injury   Negative: Injury mainly to the hip   Negative: Injury mainly to knee   Negative: Injury mainly to foot or ankle   Negative: Skin is split open or gaping (or length > 1/2 inch or 12 mm)   Negative: [1] Bleeding AND [2] won't stop after 10 minutes of direct pressure (using correct technique)   Negative: [1] Dirt in the wound AND [2] not removed with 15 minutes of scrubbing   Negative: Sounds like a serious injury to the triager   Negative: [1] SEVERE pain (e.g., excruciating pain, unable to do any normal activities)  AND [2] not improved 2 hours after pain medicine/ice packs   Negative: Suspicious history for the injury   Negative: Large swelling or bruise > 2 inches (5 cm)   Negative: [1] High-risk adult (e.g., age > 60 years, osteoporosis, chronic steroid use) AND [2] limping   Negative: [1] No prior tetanus shots (or is not fully vaccinated) AND [2] any wound (e.g., cut, scrape)   Negative: [1] HIV positive or severe  "immunodeficiency (severely weak immune system) AND [2] DIRTY cut or scrape   Negative: [1] Last tetanus shot > 5 years ago AND [2] DIRTY cut or scrape   Negative: [1] Last tetanus shot > 10 years ago AND [2] CLEAN cut or scrape (e.g., object AND skin were clean)   Negative: [1] After 3 days AND [2] still limping   Negative: [1] After 3 days AND [2] pain not improved   Negative: [1] After 2 weeks AND [2] still painful or swollen   Negative: [1] Minor injury or pain from twisting or over-stretching AND [2] walking normally    Answer Assessment - Initial Assessment Questions  1. MECHANISM: \"How did the injury happen?\" (e.g., twisting injury, direct blow)       Unsure, noticed 1.5 days ago.   2. ONSET: \"When did the injury happen?\" (Minutes or hours ago)       Knot noted on top of varicose vein.   3. LOCATION: \"Where is the injury located?\"       Right leg, 5-6 inches above knee.   4. APPEARANCE of INJURY: \"What does the injury look like?\"  (e.g., deformity of leg)      No discoloration noted.  Smaller than a marble.   5. SEVERITY: \"Can you put weight on that leg?\" \"Can you walk?\"       Yes  6. SIZE: For cuts, bruises, or swelling, ask: \"How large is it?\" (e.g., inches or centimeters)       Size of a marble.   7. PAIN: \"Is there pain?\" If Yes, ask: \"How bad is the pain?\"   \"What does it keep you from doing?\" (e.g., Scale 1-10; or mild, moderate, severe)    -  NONE: (0): no pain    -  MILD (1-3): doesn't interfere with normal activities     -  MODERATE (4-7): interferes with normal activities (e.g., work or school) or awakens from sleep, limping     -  SEVERE (8-10): excruciating pain, unable to do any normal activities, unable to walk      0  8. TETANUS: For any breaks in the skin, ask: \"When was the last tetanus booster?\"      NA  9. OTHER SYMPTOMS: \"Do you have any other symptoms?\"       Foot might be discolored earlier today.  Now feet are red.  Severe back pain today and that is not piter.   10. PREGNANCY: \"Is there " "any chance you are pregnant?\" \"When was your last menstrual period?\"        No.    Protocols used: Leg Injury-ADULT-AH    "

## 2025-02-03 NOTE — PATIENT INSTRUCTIONS

## 2025-02-03 NOTE — TELEPHONE ENCOUNTER
"Reason for Disposition  • [1] Skin growth or mole AND [2] larger than a pencil eraser, or increasing in size    Additional Information  • Negative: Sounds like a life-threatening emergency to the triager  • Small growth, spot, bump, or pigmented area of skin (e.g., moles, skin tags, wart, melanoma, skin cancer)  • Negative: [1] Looks infected AND [2] large red area (> 2 in. or 5 cm)  • Negative: [1] Fever AND [2] bump is tender to touch  • Negative: [1] Fever AND [2] spreading red area or streak  • Negative: [1] Looks infected (spreading redness, pus) AND [2] no fever  • Negative: Looks like a boil, infected sore, or deep ulcer  • Negative: Caller can't describe it clearly  • Negative: [1] Skin growth or mole AND [2] two sides do not look the same (i.e., asymmetric)  • Negative: [1] Skin growth or mole AND [2] border is irregular or blurry  • Negative: [1] Skin growth or mole AND [2] changes color, or has more than one color    Answer Assessment - Initial Assessment Questions  1. APPEARANCE of SWELLING: \"What does it look like?\"      Knot the size of marble 4-5 inches above knee at the end of a vein.  2. SIZE: \"How large is the swelling?\" (e.g., inches, cm; or compare to size of pinhead, tip of pen, eraser, coin, pea, grape, ping pong ball)       marble  3. LOCATION: \"Where is the swelling located?\"      No swelling  4. ONSET: \"When did the swelling start?\"      No swelling  5. COLOR: \"What color is it?\" \"Is there more than one color?\"      Normal skin color  6. PAIN: \"Is there any pain?\" If Yes, ask: \"How bad is the pain?\" (e.g., scale 1-10; or mild, moderate, severe)      - NONE (0): no pain    - MILD (1-3): doesn't interfere with normal activities     - MODERATE (4-7): interferes with normal activities or awakens from sleep     - SEVERE (8-10): excruciating pain, unable to do any normal activities      0  7. ITCH: \"Does it itch?\" If Yes, ask: \"How bad is the itch?\"       No  8. CAUSE: \"What do you think caused " "the swelling?\" No idea.   9 OTHER SYMPTOMS: \"Do you have any other symptoms?\" (e.g., fever)      Thought there might have been some color change to foot earlier today-- purple tint.  Could have been how she was sitting.  Currently feet are very red.    Answer Assessment - Initial Assessment Questions  1. APPEARANCE of LESION: \"What does it look like?\"       [place] sized lump 4 inches above right knee at the end of a vein.  2. SIZE: \"How big is it?\" (e.g., inches, cm; or compare to size of pinhead, tip of pen, eraser, coin, pea, grape, ping pong ball)       [place]  3. COLOR: \"What color is it?\" \"Is there more than one color?\"      Skin color  4. SHAPE: \"What shape is it?\" (e.g., round, irregular)      Round  5. RAISED: \"Does it stick up above the skin or is it flat?\" (e.g., raised or elevated)      Raised  6. TENDER: \"Does it hurt when you touch it?\"  (Scale 1-10; or mild, moderate, severe)      No.  7. LOCATION: \"Where is it located?\"       Right leg  8. ONSET: \"When did it first appear?\"       1.5 days ago  9. NUMBER: \"Is there just one?\" or \"Are there others?\"      Just one  10. CAUSE: \"What do you think it is?\"        No idea  11. OTHER SYMPTOMS: \"Do you have any other symptoms?\" (e.g., fever)        No. Just getting over RSV  12. PREGNANCY: \"Is there any chance you are pregnant?\" \"When was your last menstrual period?\"        No.    Protocols used: Skin Lump or Localized Swelling-ADULT-AH, Skin Lesion - Moles or Growths-ADULT-AH    "

## 2025-02-10 ENCOUNTER — TELEMEDICINE (OUTPATIENT)
Dept: PSYCHIATRY | Facility: CLINIC | Age: 35
End: 2025-02-10
Payer: MEDICAID

## 2025-02-10 DIAGNOSIS — F31.60 BIPOLAR AFFECTIVE DISORDER, MIXED: Chronic | ICD-10-CM

## 2025-02-10 DIAGNOSIS — F40.01 PANIC DISORDER WITH AGORAPHOBIA: Primary | Chronic | ICD-10-CM

## 2025-02-10 DIAGNOSIS — F51.04 PSYCHOPHYSIOLOGICAL INSOMNIA: Chronic | ICD-10-CM

## 2025-02-10 DIAGNOSIS — F90.2 ATTENTION DEFICIT HYPERACTIVITY DISORDER (ADHD), COMBINED TYPE: ICD-10-CM

## 2025-02-10 RX ORDER — QUETIAPINE FUMARATE 50 MG/1
50-100 TABLET, FILM COATED ORAL NIGHTLY
Qty: 60 TABLET | Refills: 2 | Status: SHIPPED | OUTPATIENT
Start: 2025-02-10

## 2025-02-10 RX ORDER — LAMOTRIGINE 25 MG/1
25 TABLET ORAL 2 TIMES DAILY
Qty: 60 TABLET | Refills: 2 | Status: SHIPPED | OUTPATIENT
Start: 2025-02-10 | End: 2026-02-10

## 2025-02-10 RX ORDER — ALPRAZOLAM 0.5 MG
0.5 TABLET ORAL 2 TIMES DAILY PRN
Qty: 60 TABLET | Refills: 0 | Status: SHIPPED | OUTPATIENT
Start: 2025-02-10 | End: 2026-02-10

## 2025-02-10 NOTE — PROGRESS NOTES
This provider is located at home address in [place], [place] using a secure [employer] Video Visit through [employer]. Patient is being seen remotely via telehealth at their home address in [place], and stated they are in a secure environment for this session. The patient's condition being diagnosed/treated is appropriate for telemedicine. The provider identified herself, as well as, her credentials.   The patient, and/or patients guardian, consent to be seen remotely, and when consent is given they understand that the consent allows for patient identifiable information to be sent to a third party as needed.   They may refuse to be seen remotely at any time. The electronic data is encrypted and password protected, and the patient and/or guardian has been advised of the potential risks to privacy not withstanding such measures.   PT Identifiers used: Name and .    You have chosen to receive care through a telehealth visit.  Do you consent to use a video/audio connection for your medical care today? Yes    The visit included audio and video interaction.  No technical issues occurred during the visit.      Chief Complaint   Patient presents with    Anxiety    Panic Attack    Depression    ADHD    Sleeping Problem    Med Management     Bipolar        Subjective   Mechelle Pugh is a 34 y.o. female who presents for   Chief Complaint   Patient presents with    Anxiety    Panic Attack    Depression    ADHD    Sleeping Problem    Med Management     Bipolar        History of Present Illness   Patient was referred by BRODY Nicole at [facility]  Her , Clayton Pugh, who is also a patient of this provider, had a stroke on 23 due to brain clot, in the ICU at [facility], still intubated  Together x 13 yrs,  since 2016  Complains of being on edge all of the time, works at [employer] but has trouble getting through a day due to anxiety, avoids family functions.  Gets irritable and frustrated  "easily  She was self medicating with pain pills, was on Buprenorphone for a while  \"When I mess something up and get criticism, cannot go forward.  I give up on everything\"   She reports a \"bad\" panic attack in 2022, thought she was dying, could not breath, kept in the ED for a couple of days, She saw er PCP in June 2022 and July 2022 and started her on Xanax temporarily, which helped her panic attacks a lot.     Went to [facility] for a while, had therapy  Depression 4/10 now that she is on Pristiq   Denies SI/HI  Anxiety 9/10, panic attacks are less lately   Difficulty falling asleep and staying asleep     The following portions of the patient's history were reviewed and updated as appropriate: allergies, current medications, past family history, past medical history, past social history, past surgical history, and problem list.      Past Psychiatric History   Axis I    Affective/Bipoloar Disorder, Anxiety/Panic Disorder, Attention Deficit Disorder, Addictive Disorder    Axis II    None    Past Outpatient Treatment    Diagnosis treated:    Affective Disorder, Anxiety/Panic Disorder, ADD, Addictive Disorder    Treatment Type:  Individual Therapy, Medication Management  Hospitalized 5/12/23 to 5/18/23 at [facility] for manic episode and SI  Prior Psychiatric Medications  Xanax   Ativan  Zoloft  Hydroxyzine  Risperdal, rocking, legs moved constantly  Propranolol  Pristiq  Support Groups   None     Sequelae Of Mental Disorder  job disruption, family disruption, emotional distress  Interval History  No Change    Side Effects  None    Social History     Socioeconomic History    Marital status:    Tobacco Use    Smoking status: Every Day     Types: Cigarettes    Smokeless tobacco: Never   Vaping Use    Vaping status: Never Used       History reviewed. No pertinent past medical history.     History reviewed. No pertinent surgical history.     History reviewed. No pertinent family history.    There are no " discontinued medications.    Current Outpatient Medications on File Prior to Visit   Medication Sig Dispense Refill    desvenlafaxine (PRISTIQ) 50 MG 24 hr tablet Take 2 tablets by mouth Daily. 90 tablet 0    norgestimate-ethinyl estradiol (Tri-Sprintec) 0.18/0.215/0.25 MG-35 MCG per tablet Take 1 tablet by mouth Daily. (Patient not taking: Reported on 2/3/2025)       No current facility-administered medications on file prior to visit.       No Known Allergies      Psychological ROS: positive for - anxiety, concentration difficulties, depression, irritability, mood swings, and sleep disturbances  negative for - behavioral disorder, decreased libido, disorientation, hallucinations, hostility, memory difficulties, obsessive thoughts, physical abuse, sexual abuse, or suicidal ideation     Objective   Physical Exam    Mental Status Exam:   Hygiene:   good  Cooperation:  Cooperative  Eye Contact:  Good  Psychomotor Behavior:  Restless  Affect:  Appropriate  Hopelessness: Denies  Speech:  Normal  Goal directed and Linear  Thought Content:  Normal  Suicidal:  None  Homicidal:  None  Hallucinations:  None  Delusion:  None  Memory:  Intact  Orientation:  Person, Place, Time, and Situation  Reliability:  good  Insight:  Good  Judgement:  Good  Impulse Control:  Good  Mood: anxious, depressed        PHQ-9 Depression Screening  Little interest or pleasure in doing things? (Patient-Rptd) Several days   Feeling down, depressed, or hopeless? (Patient-Rptd) Several days   PHQ-2 Total Score (Patient-Rptd) 2   Trouble falling or staying asleep, or sleeping too much? (Patient-Rptd) Almost all   Feeling tired or having little energy? (Patient-Rptd) Several days   Poor appetite or overeating? (Patient-Rptd) Several days   Feeling bad about yourself - or that you are a failure or have let yourself or your family down? (Patient-Rptd) Over half   Trouble concentrating on things, such as reading the newspaper or watching television?  (Patient-Rptd) Not at all   Moving or speaking so slowly that other people could have noticed? Or the opposite - being so fidgety or restless that you have been moving around a lot more than usual? (Patient-Rptd) Not at all   Thoughts that you would be better off dead, or of hurting yourself in some way? (Patient-Rptd) Not at all   PHQ-9 Total Score (Patient-Rptd) 9   If you checked off any problems, how difficult have these problems made it for you to do your work, take care of things at home, or get along with other people? (Patient-Rptd) Extremely difficult       Assessment & Plan   Diagnoses and all orders for this visit:    1. Panic disorder with agoraphobia (Primary)  -     ALPRAZolam (Xanax) 0.5 MG tablet; Take 1 tablet by mouth 2 (Two) Times a Day As Needed for Anxiety.  Dispense: 60 tablet; Refill: 0    2. Bipolar affective disorder, mixed  -     lamoTRIgine (LaMICtal) 25 MG tablet; Take 1 tablet by mouth 2 (Two) Times a Day. For mood stabilizer  Dispense: 60 tablet; Refill: 2  -     QUEtiapine (SEROquel) 50 MG tablet; Take 1-2 tablets by mouth Every Night.  Dispense: 60 tablet; Refill: 2    3. Attention deficit hyperactivity disorder (ADHD), combined type    4. Psychophysiological insomnia  -     QUEtiapine (SEROquel) 50 MG tablet; Take 1-2 tablets by mouth Every Night.  Dispense: 60 tablet; Refill: 2        TREATMENT PLAN/GOALS: Continue supportive psychotherapy efforts and medications as indicated. Treatment and medication options discussed during today's visit. Patient ackowledged and verbally consented to continue with current treatment plan and was educated on the importance of compliance with treatment and follow-up appointments.       MEDICATION ISSUES:  INSPECT reviewed as expected  Discussed medication options and treatment plan of prescribed medication as well as the risks, benefits, and side effects including potential falls, possible impaired driving and metabolic adversities among others.  Patient is agreeable to call the office with any worsening of symptoms or onset of side effects. Patient is agreeable to call 911 or go to the nearest ER should he/she begin having SI/HI. No medication side effects or related complaints today.     Patient is struggling mostly with her sleep and anxiety at this time, dealing with her BF in the ICU after a stroke.    Start Lamictal 25 mg BID for mood stabilizer  Seroquel 50 mg one or two tabs at bedtime for sleep and mood.  Xanax 0.5 mg BID prn anxiety/panic attack.    Return in about 4 weeks (around 3/10/2025) for video visit.     This document has been electronically signed by Mandy Judge PA-C  February 20, 2025 05:41 EST    EMR Dragon transcription disclaimer:  Some of this encounter note is an electronic transcription translation of spoken language to printed text. The electronic translation of spoken language may permit erroneous, or at times, nonsensical words or phrases to be inadvertently transcribed; Although I have reviewed the note for such errors some may still exist.

## 2025-02-26 ENCOUNTER — OFFICE VISIT (OUTPATIENT)
Dept: FAMILY MEDICINE CLINIC | Facility: CLINIC | Age: 35
End: 2025-02-26
Payer: MEDICAID

## 2025-02-26 VITALS
OXYGEN SATURATION: 98 % | HEIGHT: 67 IN | SYSTOLIC BLOOD PRESSURE: 142 MMHG | HEART RATE: 99 BPM | DIASTOLIC BLOOD PRESSURE: 74 MMHG | BODY MASS INDEX: 33.27 KG/M2 | WEIGHT: 212 LBS

## 2025-02-26 DIAGNOSIS — N91.2 AMENORRHEA: ICD-10-CM

## 2025-02-26 DIAGNOSIS — Z11.59 NEED FOR HEPATITIS C SCREENING TEST: ICD-10-CM

## 2025-02-26 DIAGNOSIS — Z91.89 AT RISK FOR SEXUALLY TRANSMITTED DISEASE DUE TO UNPROTECTED SEX: Primary | ICD-10-CM

## 2025-02-26 LAB
B-HCG UR QL: NEGATIVE
EXPIRATION DATE: NORMAL
INTERNAL NEGATIVE CONTROL: NORMAL
INTERNAL POSITIVE CONTROL: NORMAL
Lab: NORMAL

## 2025-02-26 RX ORDER — DROSPIRENONE AND ETHINYL ESTRADIOL 0.02-3(28)
1 KIT ORAL DAILY
COMMUNITY
Start: 2025-02-17

## 2025-02-26 NOTE — PATIENT INSTRUCTIONS

## 2025-02-26 NOTE — PROGRESS NOTES
Subjective   Mechelle Pugh is a 34 y.o. female.     Patient or patient representative verbalized consent for the use of Ambient Listening during the visit with  BRODY Nicole for chart documentation. 2/26/2025  13:28 EST    Chief Complaint   Patient presents with    Exposure to STD     Wants checked for STD's     History of Present Illness  The patient is a 34-year-old female who presents for evaluation of sexually transmitted disease, anxiety, and amenorrhea.    Sexually Transmitted Disease Evaluation  She had unprotected sexual intercourse on 02/18/2025 with her son's father, who has no known history of sexually transmitted diseases (STDs). She reports no symptoms such as sores, outbreaks, or pain. She has previously tested negative for HIV and has not had any contact with the individual who transmitted syphilis to her. She was informed by her previous physician that she was not at risk of ifeanyi syphilis from other partners.  - Onset: Unprotected sexual intercourse on 02/18/2025.  - Character: No symptoms such as sores, outbreaks, or pain.  - History: Previously tested negative for HIV; no contact with the individual who transmitted syphilis.    Anxiety and Elevated Blood Pressure  She reports elevated blood pressure and severe anxiety, even though her psychiatrist prescribed Xanax to help her cope with grief. She stopped taking Suboxone and antidepressants abruptly while in the hospital and has not resumed them since 02/09/2025. She does not plan to restart Suboxone due to the detoxification process she experienced but is open to resuming her antidepressants. She has been struggling with various aspects of her life, including eating, sleeping, and medication adherence.  - Onset: Stopped Suboxone and antidepressants abruptly on 02/09/2025.  - Character: Elevated blood pressure and severe anxiety.  - Alleviating/Aggravating Factors: Xanax prescribed by psychiatrist; stopped Suboxone and  antidepressants.  - Timing: Struggling with eating, sleeping, and medication adherence since stopping medications.    Amenorrhea  She has a history of amenorrhea during periods of stress, with her last menstrual cycle occurring at the end of December 2024. Despite being on birth control, she continues to experience missed periods. She initiated birth control due to hormonal imbalances that caused monthly vomiting episodes, a problem she has faced since the onset of menstruation in her youth. She has been on birth control for approximately 4 months, which has alleviated her vomiting episodes.  - Onset: Last menstrual cycle at the end of December 2024.  - Duration: Amenorrhea since the end of December 2024.  - Character: Missed periods despite being on birth control.  - History: Hormonal imbalances causing monthly vomiting episodes since the onset of menstruation; on birth control for approximately 4 months, alleviating vomiting episodes.    MEDICATIONS  - Current: Xanax  - Discontinued: Suboxone     The following portions of the patient's history were reviewed and updated as appropriate: allergies, current medications, past family history, past medical history, past social history, past surgical history and problem list.    Results         Objective     Vitals:    02/26/25 1307   BP: 142/74   Pulse: 99   SpO2: 98%      Body mass index is 33.2 kg/m².    Physical Exam  Vitals reviewed.   Constitutional:       Appearance: Normal appearance. She is obese.   Cardiovascular:      Rate and Rhythm: Normal rate.   Pulmonary:      Effort: Pulmonary effort is normal.   Genitourinary:     Comments: deferred  Skin:     General: Skin is warm and dry.   Neurological:      Mental Status: She is alert and oriented to person, place, and time.   Psychiatric:         Behavior: Behavior normal.         Thought Content: Thought content does not include homicidal or suicidal ideation. Thought content does not include homicidal or suicidal  plan.       Assessment & Plan  1. Sexually transmitted disease  - Had unprotected sex on 02/18/2025 with a known partner with no known history of STDs  - History of syphilis, always tested negative for HIV  - Urine test to screen for chlamydia, gonorrhea, and trichomoniasis  - Blood tests to check for HIV and syphilis  - If syphilis titer is trending down or at zero, it will confirm effective treatment    2. Anxiety  - Reports high anxiety levels  - Prescribed Xanax by psychiatrist for grieving  - Stopped taking antidepressants and Suboxone since 02/09/2025  - Advised to consider resuming antidepressants to manage symptoms better  - Encouraged to prioritize self-care during this period of grief    3. Amenorrhea  - Reports missing period since December 2024, attributed to stress  - Currently on birth control, resumed on 02/12/2025 after being in the hospital       Discussed Care Gaps, ordered referrals and encouraged vaccination updates.       - Pt agrees with plan of care and denies further questions/concerns today  - This document is intended for medical expert use only. Persons  reading this document without medical staff guidance may result in misinterpretation and unintended morbidity     Go to the ER for any possible life-threatening symptoms such as chest pain or shortness of air.      Please allow 3-5 business days for recommendations based on new results      I personally spent time with this patient, preparing for the visit, reviewing tests, obtaining and/or reviewing a separately obtained history, performing a medically appropriate examination and/or evaluation, counseling and educating the patient/family/caregiver, ordering medications,  documenting information in the medical record and indepentently interpreting results.

## 2025-02-27 LAB
HCV IGG SERPL QL IA: NON REACTIVE
HIV 1+2 AB+HIV1 P24 AG SERPL QL IA: NON REACTIVE
OBSERVATION IMP: ABNORMAL
RPR SER QL: REACTIVE
RPR SER-TITR: ABNORMAL TITER
TREPONEMA PALLIDUM IGG+IGM AB [PRESENCE] IN SERUM OR PLASMA BY IMMUNOASSAY: REACTIVE

## 2025-02-28 ENCOUNTER — TELEPHONE (OUTPATIENT)
Dept: FAMILY MEDICINE CLINIC | Facility: CLINIC | Age: 35
End: 2025-02-28
Payer: MEDICAID

## 2025-02-28 LAB
C TRACH RRNA SPEC QL NAA+PROBE: NEGATIVE
N GONORRHOEA RRNA SPEC QL NAA+PROBE: NEGATIVE
T VAGINALIS RRNA SPEC QL NAA+PROBE: NEGATIVE

## 2025-02-28 NOTE — TELEPHONE ENCOUNTER
Hub to relay    Syphilis titer is stable. No evidence of re-infection. Will recheck in 6 months.     -Negative HIV.     -Negative Hep C.     -Negative chlamydia, gonorrhea, trichomonas  -Ramonita    I sent [employer] msg informing pt of lab result and left vague msg of results on [facility]

## 2025-02-28 NOTE — TELEPHONE ENCOUNTER
Caller: Mechelle Pugh    Relationship: Self    Best call back number: [phone]     What test was performed: URINE CULTURE    When was the test performed: 02/27/2025    Where was the test performed: IN OFFICE    Additional notes: PATIENT STATES THAT SHE WOULD LIKE SOMEONE TO CALL HER TO GO OVER HER URINE CULTURE AS IT SAYS ON HER MYCHART THAT THERE IS BLOOD IN HER URINE.     PLEASE CALL PATIENT TO DISCUSS.

## 2025-03-13 RX ORDER — DROSPIRENONE AND ETHINYL ESTRADIOL 0.02-3(28)
1 KIT ORAL DAILY
Qty: 28 TABLET | Refills: 0 | Status: SHIPPED | OUTPATIENT
Start: 2025-03-13

## 2025-03-13 NOTE — TELEPHONE ENCOUNTER
Caller: Mechelle Pugh    Relationship: Self    Best call back number: [phone]     Requested Prescriptions:   Requested Prescriptions     Pending Prescriptions Disp Refills    drospirenone-ethinyl estradiol (KEEGAN,GIANVI) 3-0.02 MG per tablet 28 tablet      Sig: Take 1 tablet by mouth Daily.        Pharmacy where request should be sent: [id number] [address] [facility] [phone] [facility] [phone]      Last office visit with prescribing clinician: 2/26/2025   Last telemedicine visit with prescribing clinician: Visit date not found   Next office visit with prescribing clinician: 3/24/2025     Additional details provided by patient: PATIENT LOST THEIR MEDICATION WHILE MOVING. REQUESTING A NEW PRESCRIPTION BE SENT IN    Does the patient have less than a 3 day supply:  [x] Yes  [] No    Would you like a call back once the refill request has been completed: [] Yes [x] No    If the office needs to give you a call back, can they leave a voicemail: [x] Yes [] No    Mert Leone Rep   03/13/25 13:00 EDT

## 2025-03-24 ENCOUNTER — OFFICE VISIT (OUTPATIENT)
Dept: FAMILY MEDICINE CLINIC | Facility: CLINIC | Age: 35
End: 2025-03-24
Payer: MEDICAID

## 2025-03-24 VITALS
HEART RATE: 96 BPM | HEIGHT: 68 IN | BODY MASS INDEX: 31.16 KG/M2 | SYSTOLIC BLOOD PRESSURE: 128 MMHG | WEIGHT: 205.6 LBS | DIASTOLIC BLOOD PRESSURE: 80 MMHG | OXYGEN SATURATION: 97 %

## 2025-03-24 DIAGNOSIS — Z00.00 ANNUAL PHYSICAL EXAM: Primary | ICD-10-CM

## 2025-03-24 PROBLEM — E66.811 CLASS 1 OBESITY DUE TO EXCESS CALORIES WITH SERIOUS COMORBIDITY AND BODY MASS INDEX (BMI) OF 31.0 TO 31.9 IN ADULT: Status: ACTIVE | Noted: 2025-03-24

## 2025-03-24 PROBLEM — Z22.7 TB LUNG, LATENT: Status: RESOLVED | Noted: 2024-12-09 | Resolved: 2025-03-24

## 2025-03-24 PROBLEM — A51.49 SECONDARY SYPHILIS: Status: RESOLVED | Noted: 2024-12-09 | Resolved: 2025-03-24

## 2025-03-24 PROBLEM — E66.09 CLASS 1 OBESITY DUE TO EXCESS CALORIES WITH SERIOUS COMORBIDITY AND BODY MASS INDEX (BMI) OF 31.0 TO 31.9 IN ADULT: Status: ACTIVE | Noted: 2025-03-24

## 2025-03-24 PROBLEM — Z86.19 HISTORY OF SYPHILIS: Status: ACTIVE | Noted: 2025-03-24

## 2025-03-24 PROBLEM — F11.11 HISTORY OF OPIOID ABUSE: Status: ACTIVE | Noted: 2025-03-24

## 2025-03-24 PROCEDURE — 1159F MED LIST DOCD IN RCRD: CPT

## 2025-03-24 PROCEDURE — 99395 PREV VISIT EST AGE 18-39: CPT

## 2025-03-24 PROCEDURE — 2014F MENTAL STATUS ASSESS: CPT

## 2025-03-24 PROCEDURE — 1160F RVW MEDS BY RX/DR IN RCRD: CPT

## 2025-03-24 RX ORDER — DESVENLAFAXINE 100 MG/1
100 TABLET, EXTENDED RELEASE ORAL DAILY
COMMUNITY

## 2025-03-24 NOTE — PATIENT INSTRUCTIONS

## 2025-03-24 NOTE — PROGRESS NOTES
Subjective   Mechelle Pugh is a 34 y.o. female. Presents today for   Chief Complaint   Patient presents with    Annual Exam         Patient or patient representative verbalized consent for the use of Ambient Listening during the visit with  BRODY Nicole for chart documentation. 3/24/2025  12:03 EDT    History of Present Illness  The patient is a 34-year-old female who presents for evaluation of tonsillitis, anxiety, depression, ADHD, bipolar disorder, panic disorder, PTSD, syphilis, tuberculosis, HPV, and smoking cessation.    Tonsillitis  She was recently diagnosed with tonsillitis in the emergency room, which was severe enough to impact her respiratory function. However, she reports no current symptoms. She was prescribed Augmentin as part of her treatment regimen.  - Onset: Recently diagnosed in the emergency room.  - Character: Severe enough to impact respiratory function.  - Alleviating Factors: Prescribed Augmentin.    Anxiety and Stress  She has been managing her anxiety and stress with Pristiq, which she resumed after a period of discontinuation due to elevated blood pressure and heart rate. Within 3 days of resuming the medication, her vital signs normalized.  - Onset: Resumed Pristiq after discontinuation.  - Alleviating Factors: Pristiq.  - Timing: Within 3 days of resuming the medication.    Weight Loss  She has been experiencing weight loss, which she attributes to a healthier diet. Her daily diet includes a variety of fruits such as oranges, bananas, strawberries, and blueberries, low-sodium snacks, baked chicken, chicken breast, chicken stir perkins with rice, fish, and brown rice. She has eliminated soda from her diet and primarily consumes water. She has also incorporated regular exercise into her routine, walking for approximately 2 hours each day.  - Onset: Recent.  - Alleviating Factors: Healthier diet and regular exercise.  - Timing: Walks for approximately 2 hours each day.    Sleep  Quality  She reports improved sleep quality, averaging 8 hours per night, compared to previous periods of insomnia.  - Onset: Recent improvement.  - Duration: Averaging 8 hours per night.  - Character: Improved sleep quality compared to previous insomnia.    Bipolar 1 Disorder  She has been diagnosed with bipolar 1 disorder. Her mood has been fluctuating over the past few weeks, with periods of happiness followed by episodes of staring blankly. She is actively seeking to stabilize her mood and has scheduled an appointment with her psychiatrist for next month.  - Onset: Mood fluctuations over the past few weeks.  - Character: Periods of happiness followed by episodes of staring blankly.  - Alleviating Factors: Scheduled appointment with psychiatrist.    Panic Disorder  She has a history of panic disorder with severe panic attacks.    PTSD  She has a history of PTSD.    Syphilis  She tested positive for syphilis once and has been treated for it. A recheck is scheduled for August 2025.  - Onset: Tested positive once.  - Alleviating Factors: Treated for syphilis.  - Timing: Recheck scheduled for August 2025.    Tuberculosis  She tested positive for tuberculosis, but the health department believes it was a false positive. A chest x-ray was performed and it was negative.  - Onset: Tested positive.  - Character: Believed to be a false positive.  - Alleviating Factors: Chest x-ray was negative.    HPV  She has a history of HPV.    Smoking Cessation  She is a current smoker, consuming one pack per day, and does not use vaping products or marijuana. She recently started drinking coffee, consuming about half a cup per day, but does not consume energy drinks. She occasionally wakes up during the night to smoke but is interested in quitting smoking.  - Onset: Current smoker.  - Character: Consumes one pack per day.  - Alleviating Factors: Interested in quitting smoking.    Supplemental Information  She has a history of  chondromalacia of both kneecaps. She completed physical therapy for her knee and has not experienced any issues since. She has a cyst on her ovary.    SOCIAL HISTORY  - Currently smoking a pack a day  - Does not vape or use recreational drugs  - Occasionally drinks alcohol but has not consumed any recently    MEDICATIONS  - Current medications:    - Augmentin    - Suboxone    - Pristiq    - Keegan    - Ibuprofen       Past Medical History:   Diagnosis Date    Secondary syphilis 12/09/2024    TB lung, latent 12/09/2024       Past Surgical History:   Procedure Laterality Date    DILATATION AND CURETTAGE          No Known Allergies    Current Outpatient Medications on File Prior to Visit   Medication Sig Dispense Refill    amoxicillin-clavulanate (AUGMENTIN) 875-125 MG per tablet Take 1 tablet by mouth 2 (Two) Times a Day for 10 days. 20 tablet 0    buprenorphine-naloxone (SUBOXONE) 8-2 MG film film DISSOLVE 3 FILMS UNDER THE TONGUE ONCE DAILY AS DIRECTED      desvenlafaxine (Pristiq) 100 MG 24 hr tablet Take 1 tablet by mouth Daily.      drospirenone-ethinyl estradiol (KEEGAN,GIANVI) 3-0.02 MG per tablet Take 1 tablet by mouth Daily. 28 tablet 0    ibuprofen (ADVIL,MOTRIN) 800 MG tablet Take 1 tablet by mouth Every 8 (Eight) Hours As Needed for Mild Pain, Moderate Pain, Fever or Headache for up to 21 doses. 21 tablet 0     No current facility-administered medications on file prior to visit.       Social History     Socioeconomic History    Marital status:    Tobacco Use    Smoking status: Every Day     Types: Cigarettes    Smokeless tobacco: Never   Vaping Use    Vaping status: Never Used   Substance and Sexual Activity    Alcohol use: Yes     Review of Systems   Denies dizziness, fatigue, fever/chills, CP, SOA, headache, blood in urine/stool, abd pain, leg swelling.    Results  - Laboratory Studies:    - Tested positive for syphilis once    - Tested positive for tuberculosis, but chest x-ray was negative    "    Objective   Vitals:    03/24/25 1139   BP: 128/80   Pulse: 96   SpO2: 97%   Weight: 93.3 kg (205 lb 9.6 oz)   Height: 172.7 cm (68\")     Body mass index is 31.26 kg/m².    Physical Exam  Vitals reviewed.   Constitutional:       General: She is not in acute distress.     Appearance: Normal appearance. She is obese. She is not ill-appearing or toxic-appearing.   HENT:      Right Ear: Tympanic membrane, ear canal and external ear normal.      Left Ear: Tympanic membrane, ear canal and external ear normal.      Nose: No congestion or rhinorrhea.      Mouth/Throat:      Mouth: Mucous membranes are moist.      Pharynx: Oropharynx is clear. No oropharyngeal exudate or posterior oropharyngeal erythema.   Eyes:      Extraocular Movements: Extraocular movements intact.      Conjunctiva/sclera: Conjunctivae normal.      Pupils: Pupils are equal, round, and reactive to light.   Cardiovascular:      Rate and Rhythm: Normal rate and regular rhythm.      Heart sounds: Normal heart sounds.   Pulmonary:      Effort: Pulmonary effort is normal. No respiratory distress.      Breath sounds: Normal breath sounds.   Abdominal:      General: Bowel sounds are normal.   Musculoskeletal:         General: Normal range of motion.   Lymphadenopathy:      Cervical: No cervical adenopathy.   Skin:     General: Skin is warm and dry.      Capillary Refill: Capillary refill takes less than 2 seconds.   Neurological:      General: No focal deficit present.      Mental Status: She is alert and oriented to person, place, and time.      Motor: No weakness.   Psychiatric:         Behavior: Behavior normal.       Assessment & Plan  1. Tonsillitis  - Seen at the ER for tonsillitis affecting breathing  - Prescribed Augmentin  - Reports no current symptoms    2. Generalized Anxiety Disorder  - Restarted Pristiq due to increased anxiety and stress  - Elevated blood pressure and heart rate  - Symptoms improved within 3 days of resuming medication    3. " Depression  - Currently on Pristiq for depression  - Reports mood fluctuations  - Working on stabilizing mood    4. Attention Deficit Hyperactivity Disorder (ADHD)  - No specific treatment changes discussed during this visit    5. Ovarian Cyst  - No new symptoms or changes reported    6. Bipolar I Disorder  - Reports mood fluctuations consistent with bipolar disorder  - Working on stabilizing mood  - Upcoming appointment with psychiatrist    7. Panic Disorder with Severe Panic Attacks  - Continues to experience severe panic attacks  - Advised to continue current medication regimen  - Follow up with psychiatrist    8. Post-Traumatic Stress Disorder (PTSD)  - No specific treatment changes discussed during this visit    9. History of Syphilis  - Tested positive for syphilis once and has been treated  - Recheck scheduled for August 2025    10. History of Tuberculosis  - False positive TB test  - Negative chest x-ray    11. Human Papillomavirus (HPV)  - No new symptoms or changes reported    12. Smoking Cessation  - Currently smokes a pack a day  - Recommendations:    - Reduce cigarette consumption by half a pack per day    - Gradually taper off    - Move cigarettes away from bed to avoid smoking during the night    Follow-up  - Patient will follow up in 6 months     Return in about 6 months (around 9/24/2025) for Next scheduled follow up.     Counseled on a healthy diet. Use condoms 100% of time with sex as IUD does not protect against STIs. Eat a healthy diet and exercise routinely. Avoid smoking/alcohol and drugs. Use seatbelt 100% of time.     Discussed Care Gaps, ordered referrals and encouraged vaccination updates.       - Pt agrees with plan of care and denies further questions/concerns today  - This document is intended for medical expert use only. Persons  reading this document without medical staff guidance may result in misinterpretation and unintended morbidity     Go to the ER for any possible  life-threatening symptoms such as chest pain or shortness of air.      Please allow 3-5 business days for recommendations based on new results      I personally spent time with this patient, preparing for the visit, reviewing tests, obtaining and/or reviewing a separately obtained history, performing a medically appropriate examination and/or evaluation, counseling and educating the patient/family/caregiver, ordering medications,  documenting information in the medical record and indepentently interpreting results.

## 2025-04-03 DIAGNOSIS — F33.1 MODERATE EPISODE OF RECURRENT MAJOR DEPRESSIVE DISORDER: ICD-10-CM

## 2025-04-03 DIAGNOSIS — F41.1 GAD (GENERALIZED ANXIETY DISORDER): ICD-10-CM

## 2025-04-03 RX ORDER — DESVENLAFAXINE 50 MG/1
100 TABLET, FILM COATED, EXTENDED RELEASE ORAL DAILY
Qty: 90 TABLET | Refills: 0 | OUTPATIENT
Start: 2025-04-03

## 2025-04-11 RX ORDER — DROSPIRENONE AND ETHINYL ESTRADIOL 0.02-3(28)
1 KIT ORAL DAILY
Qty: 28 TABLET | Refills: 3 | Status: SHIPPED | OUTPATIENT
Start: 2025-04-11

## 2025-04-25 ENCOUNTER — OFFICE VISIT (OUTPATIENT)
Dept: FAMILY MEDICINE CLINIC | Facility: CLINIC | Age: 35
End: 2025-04-25
Payer: MEDICAID

## 2025-04-25 VITALS
BODY MASS INDEX: 31.1 KG/M2 | DIASTOLIC BLOOD PRESSURE: 72 MMHG | OXYGEN SATURATION: 97 % | HEART RATE: 83 BPM | HEIGHT: 68 IN | WEIGHT: 205.2 LBS | SYSTOLIC BLOOD PRESSURE: 126 MMHG

## 2025-04-25 DIAGNOSIS — N30.00 ACUTE CYSTITIS WITHOUT HEMATURIA: ICD-10-CM

## 2025-04-25 DIAGNOSIS — Z09 HOSPITAL DISCHARGE FOLLOW-UP: Primary | ICD-10-CM

## 2025-04-25 LAB
BILIRUB BLD-MCNC: NEGATIVE MG/DL
CLARITY, POC: CLEAR
COLOR UR: YELLOW
GLUCOSE UR STRIP-MCNC: NEGATIVE MG/DL
KETONES UR QL: NEGATIVE
LEUKOCYTE EST, POC: NEGATIVE
NITRITE UR-MCNC: NEGATIVE MG/ML
PH UR: 6 [PH] (ref 5–8)
PROT UR STRIP-MCNC: NEGATIVE MG/DL
RBC # UR STRIP: NEGATIVE /UL
SP GR UR: 1.02 (ref 1–1.03)
UROBILINOGEN UR QL: NORMAL

## 2025-04-25 NOTE — PATIENT INSTRUCTIONS

## 2025-04-25 NOTE — PROGRESS NOTES
Subjective   Mechelle Pugh is a 34 y.o. female.     Patient or patient representative verbalized consent for the use of Ambient Listening during the visit with  BRODY Nicole for chart documentation. 4/25/2025  08:10 EDT    Chief Complaint   Patient presents with    Hospital Follow Up Visit     UTI       History of Present Illness  The patient is a 34-year-old female who presents for evaluation of urinary tract infection.    Urinary Tract Infection (UTI)  She was diagnosed with a urinary tract infection (UTI) at [facility] approximately 10 days ago, on 04/15/2025. A 5-day course of Bactrim was prescribed along with Diflucan due to her history of developing yeast infections post-antibiotic treatment. Diflucan was taken proactively 4 days into the antibiotic course, despite not experiencing symptoms of a yeast infection. No new medication changes are reported apart from the Bactrim and Diflucan. UTI symptoms improved with Bactrim, but bladder pain persists when it is full, subsiding upon urination. The pain is described as crampy and similar to soreness experienced after a D and C procedure following a miscarriage. Current pain level is rated as 5 out of 10. No abnormal vaginal discharge or burning sensation during urination is reported. STD testing has been conducted, and no new sexual partners are reported. Regular bowel movements are noted, although constipation occurred for 2 weeks when she was not eating, which resolved after taking magnesium citrate. No new onset of low back pain is reported. Increased consumption of milk and tea is noted, and urine, usually dark, has been clear recently. An unusual smell in urine was noticed before the ER visit. Belief is expressed that her body has developed a tolerance to certain antibiotics due to frequent UTIs. Keflex has been previously prescribed for UTIs. AZO was taken for about a week but stopped 3 days ago. Increased frequency of urination is reported,  waking up 3 to 4 times last night. The last UTI occurred about 6 to 9 months ago. Urination before and after sex is consistently practiced. A history of ovarian cysts, one of which ruptured, necessitating a D and C procedure, is noted.  - Onset: Diagnosed approximately 10 days ago, on 04/15/2025.  - Location: Bladder pain.  - Duration: Persistent bladder pain when full, subsiding upon urination.  - Character: Crampy pain, similar to soreness after a D and C procedure.  - Alleviating/Aggravating Factors: Pain subsides upon urination; proactive use of Diflucan.  - Severity: Pain level rated as 5 out of 10.  - Timing: Increased frequency of urination, waking up 3 to 4 times last night.    GYNECOLOGICAL HISTORY:  - Frequency and Flow: Irregular menstrual cycles, spotting 2 weeks after period, lasting 2 days before stopping.    She missed her period for a month following her 's death 2 months ago. Currently on birth control and due to start placebo pills in a week and a half.    PAST SURGICAL HISTORY:  - D and C procedure following a miscarriage.  - D and C procedure due to ruptured ovarian cyst.       The following portions of the patient's history were reviewed and updated as appropriate: allergies, current medications, past family history, past medical history, past social history, past surgical history and problem list.    Results  - Labs:    - Urine test: No infection       Objective     Vitals:    04/25/25 0758   BP: 126/72   Pulse: 83   SpO2: 97%      Body mass index is 31.2 kg/m².    Physical Exam  Vitals reviewed.   Constitutional:       General: She is not in acute distress.     Appearance: Normal appearance. She is obese. She is not ill-appearing or toxic-appearing.   Cardiovascular:      Rate and Rhythm: Normal rate.   Pulmonary:      Effort: Pulmonary effort is normal.   Abdominal:      General: Bowel sounds are normal.      Tenderness: There is abdominal tenderness in the suprapubic area. There is no  right CVA tenderness or left CVA tenderness.   Skin:     General: Skin is warm and dry.   Neurological:      Mental Status: She is alert and oriented to person, place, and time.   Psychiatric:         Behavior: Behavior normal.       Assessment & Plan  1. Post-UTI cystitis  - Urine sample negative for infection, culture sent for further analysis (results expected by Monday)  - Kidney function within normal limits  - Advised to contact office if condition deteriorates over weekend; if stable or improving, wait before initiating additional treatments  - May resume AZO temporarily if it provides relief  - Increase fluid intake, particularly water; avoid acidic substances and caffeine  - If urine culture returns positive, initiate appropriate treatment based on sensitivity results    2. Irregular menstrual cycles  - Reports irregular periods and spotting, particularly after recent stress of 's death  - Currently on birth control, noted irregular periods since event  - No new partners, tested for STDs after last sexual activity  - No abnormal vaginal discharge reported       Discussed Care Gaps, ordered referrals and encouraged vaccination updates.       - Pt agrees with plan of care and denies further questions/concerns today  - This document is intended for medical expert use only. Persons  reading this document without medical staff guidance may result in misinterpretation and unintended morbidity     Go to the ER for any possible life-threatening symptoms such as chest pain or shortness of air.      Please allow 3-5 business days for recommendations based on new results      I personally spent time with this patient, preparing for the visit, reviewing tests, obtaining and/or reviewing a separately obtained history, performing a medically appropriate examination and/or evaluation, counseling and educating the patient/family/caregiver, ordering medications,  documenting information in the medical record and  indepentently interpreting results.

## 2025-04-27 LAB
BACTERIA UR CULT: NORMAL
BACTERIA UR CULT: NORMAL

## 2025-05-15 DIAGNOSIS — F40.01 PANIC DISORDER WITH AGORAPHOBIA: Chronic | ICD-10-CM

## 2025-05-15 DIAGNOSIS — F33.1 MODERATE EPISODE OF RECURRENT MAJOR DEPRESSIVE DISORDER: ICD-10-CM

## 2025-05-15 DIAGNOSIS — F41.1 GAD (GENERALIZED ANXIETY DISORDER): ICD-10-CM

## 2025-05-15 RX ORDER — DESVENLAFAXINE 100 MG/1
100 TABLET, EXTENDED RELEASE ORAL DAILY
Qty: 60 TABLET | Refills: 2 | Status: SHIPPED | OUTPATIENT
Start: 2025-05-15

## 2025-05-15 RX ORDER — ALPRAZOLAM 0.5 MG
0.5 TABLET ORAL 2 TIMES DAILY PRN
Qty: 60 TABLET | Refills: 0 | OUTPATIENT
Start: 2025-05-15 | End: 2026-05-15

## 2025-05-15 NOTE — TELEPHONE ENCOUNTER
Patient was called from the no show list. Patient stated she over slept, that she lost her  and since then she either can't sleep or she sleeps to much. Patient was rescheduled for later date, but is requesting refill to hold her over till appointment. Patient was educated on no show policy as well.     Please advise

## 2025-06-17 ENCOUNTER — TELEMEDICINE (OUTPATIENT)
Dept: PSYCHIATRY | Facility: CLINIC | Age: 35
End: 2025-06-17
Payer: MEDICAID

## 2025-06-17 DIAGNOSIS — F33.1 MODERATE EPISODE OF RECURRENT MAJOR DEPRESSIVE DISORDER: Primary | Chronic | ICD-10-CM

## 2025-06-17 DIAGNOSIS — F51.04 PSYCHOPHYSIOLOGICAL INSOMNIA: Chronic | ICD-10-CM

## 2025-06-17 DIAGNOSIS — F40.01 PANIC DISORDER WITH AGORAPHOBIA: Chronic | ICD-10-CM

## 2025-06-17 DIAGNOSIS — F41.1 GAD (GENERALIZED ANXIETY DISORDER): Chronic | ICD-10-CM

## 2025-06-17 RX ORDER — QUETIAPINE FUMARATE 100 MG/1
100 TABLET, FILM COATED ORAL NIGHTLY
Qty: 90 TABLET | Refills: 1 | Status: SHIPPED | OUTPATIENT
Start: 2025-06-17

## 2025-06-17 RX ORDER — QUETIAPINE FUMARATE 100 MG/1
100 TABLET, FILM COATED ORAL NIGHTLY
COMMUNITY
Start: 2025-02-10 | End: 2025-06-17 | Stop reason: SDUPTHER

## 2025-06-17 RX ORDER — DESVENLAFAXINE 100 MG/1
100 TABLET, EXTENDED RELEASE ORAL DAILY
Qty: 90 TABLET | Refills: 3 | Status: SHIPPED | OUTPATIENT
Start: 2025-06-17

## 2025-06-17 NOTE — PROGRESS NOTES
This provider is located at home address in [place], [place] for [facility] (through [facility]), [address], [place] [address] using a secure [employer] Video Visit through [employer]. Patient is being seen remotely via telehealth at their home address in [place], and stated they are in a secure environment for this session. Provider is currently licensed and credentialed in both the [facility] [place] and [place].The patient's condition being diagnosed/treated is appropriate for telemedicine. The provider identified herself, as well as, her credentials.   The patient, and/or patients guardian, consent to be seen remotely, and when consent is given they understand that the consent allows for patient identifiable information to be sent to a third party as needed.   They may refuse to be seen remotely at any time. The electronic data is encrypted and password protected, and the patient and/or guardian has been advised of the potential risks to privacy not withstanding such measures.   Patient identifiers used: Name and .    You have chosen to receive care through a telehealth visit.  Do you consent to use a video/audio connection for your medical care today? Yes    The visit included audio and video interaction.  No technical issues occurred during the visit.       Subjective   Mechelle Pugh is a 35 y.o. female who presents for follow up    Chief Complaint   Patient presents with    Anxiety    Depression    Sleeping Problem    Med Management       History of Present Illness   Patient was referred by BRODY Nicole at [facility]    Patient was seen in Feb for her initial visit and has not had a follow up since.  After her hsuband passed away, she isolated herself for a couple of months, but was taking her medication, has not been back to work yet.  Her , Clayton Pugh, who is also a patient of this provider, had a stroke on 25  "due to brain clot, in the ICU at [facility], ended up passing away 2/12/25     Started back on Suboxone through [facility] on [address] in [place]    Together x 13 yrs,  since 2016  Complains of being on edge all of the time, works at [employer] but has trouble getting through a day due to anxiety, avoids family functions.  Gets irritable and frustrated easily  She was self medicating with pain pills, was on Buprenorphone for a while  \"When I mess something up and get criticism, cannot go forward.  I give up on everything\"   She reports a \"bad\" panic attack in 2022, thought she was dying, could not breath, kept in the ED for a couple of days, She saw er PCP in June 2022 and July 2022 and started her on Xanax temporarily, which helped her panic attacks a lot.     Went to [facility] for a while, had therapy  Depression 4/10 now that she is on Pristiq   Denies SI/HI  Anxiety 9/10, panic attacks are less lately   Difficulty falling asleep and staying asleep     The following portions of the patient's history were reviewed and updated as appropriate: allergies, current medications, past family history, past medical history, past social history, past surgical history, and problem list.      Past Psychiatric History   Axis I    Affective/Bipoloar Disorder, Anxiety/Panic Disorder, Attention Deficit Disorder, Addictive Disorder    Axis II    None    Past Outpatient Treatment    Diagnosis treated:    Affective Disorder, Anxiety/Panic Disorder, ADD, Addictive Disorder    Treatment Type:  Individual Therapy, Medication Management  Hospitalized 5/12/23 to 5/18/23 at the [facility] for manic episode and SI  Prior Psychiatric Medications  Xanax   Ativan  Zoloft  Hydroxyzine  Risperdal, rocking, legs moved constantly  Propranolol  Pristiq  Lamictal, felt numb  Support Groups   None     Sequelae Of Mental Disorder  job disruption, family disruption, emotional distress  Interval History  No Change    Side Effects  None    Past " Psychiatric History was reviewed and compared to 2/10/25 visit and appropriate updates were made.     Social History     Socioeconomic History    Marital status:    Tobacco Use    Smoking status: Every Day     Types: Cigarettes    Smokeless tobacco: Never   Vaping Use    Vaping status: Never Used   Substance and Sexual Activity    Alcohol use: Yes    Drug use: Not Currently     Types: Codeine, Oxycodone       Past Medical History:   Diagnosis Date    Secondary syphilis 12/09/2024    TB lung, latent 12/09/2024        Past Surgical History:   Procedure Laterality Date    DILATATION AND CURETTAGE          History reviewed. No pertinent family history.    Medications Discontinued During This Encounter   Medication Reason    desvenlafaxine (PRISTIQ) 100 MG 24 hr tablet Reorder    QUEtiapine (SEROquel) 100 MG tablet Reorder       Current Outpatient Medications on File Prior to Visit   Medication Sig Dispense Refill    buprenorphine-naloxone (SUBOXONE) 8-2 MG film film DISSOLVE 3 FILMS UNDER THE TONGUE ONCE DAILY AS DIRECTED      drospirenone-ethinyl estradiol (KEEGAN,GIANVI) 3-0.02 MG per tablet TAKE 1 TABLET BY MOUTH EVERY DAY 28 tablet 3    ibuprofen (ADVIL,MOTRIN) 800 MG tablet Take 1 tablet by mouth Every 8 (Eight) Hours As Needed for Mild Pain, Moderate Pain, Fever or Headache for up to 21 doses. 21 tablet 0     No current facility-administered medications on file prior to visit.       No Known Allergies      Psychological ROS: positive for - anxiety, concentration difficulties, depression, irritability, mood swings, and sleep disturbances  negative for - behavioral disorder, decreased libido, disorientation, hallucinations, hostility, memory difficulties, obsessive thoughts, physical abuse, sexual abuse, or suicidal ideation     Objective   Physical Exam    Mental Status Exam:   Hygiene:   good  Cooperation:  Cooperative  Eye Contact:  Good  Psychomotor Behavior:  Restless  Affect:  Appropriate  Hopelessness:  Denies  Speech:  Normal  Goal directed and Linear  Thought Content:  Normal  Suicidal:  None  Homicidal:  None  Hallucinations:  None  Delusion:  None  Memory:  Intact  Orientation:  Person, Place, Time, and Situation  Reliability:  good  Insight:  Good  Judgement:  Good  Impulse Control:  Good  Mood: anxious, depressed     Mental Status Exam was reviewed and compared to 2/10/25 visit and appropriate updates were made.     PHQ-9 Depression Screening  Little interest or pleasure in doing things? (Patient-Rptd) Several days   Feeling down, depressed, or hopeless? (Patient-Rptd) Several days   PHQ-2 Total Score (Patient-Rptd) 2   Trouble falling or staying asleep, or sleeping too much? (Patient-Rptd) Several days   Feeling tired or having little energy? (Patient-Rptd) Several days   Poor appetite or overeating? (Patient-Rptd) Almost all   Feeling bad about yourself - or that you are a failure or have let yourself or your family down? (Patient-Rptd) Several days   Trouble concentrating on things, such as reading the newspaper or watching television? (Patient-Rptd) Several days   Moving or speaking so slowly that other people could have noticed? Or the opposite - being so fidgety or restless that you have been moving around a lot more than usual? (Patient-Rptd) Not at all   Thoughts that you would be better off dead, or of hurting yourself in some way? (Patient-Rptd) Not at all   PHQ-9 Total Score (Patient-Rptd) 9   If you checked off any problems, how difficult have these problems made it for you to do your work, take care of things at home, or get along with other people? (Patient-Rptd) Somewhat difficult       Assessment & Plan   Diagnoses and all orders for this visit:    1. Moderate episode of recurrent major depressive disorder (Primary)  -     desvenlafaxine (PRISTIQ) 100 MG 24 hr tablet; Take 1 tablet by mouth Daily.  Dispense: 90 tablet; Refill: 3    2. JUAN (generalized anxiety disorder)  -     desvenlafaxine  (PRISTIQ) 100 MG 24 hr tablet; Take 1 tablet by mouth Daily.  Dispense: 90 tablet; Refill: 3    3. Panic disorder with agoraphobia    4. Psychophysiological insomnia    Other orders  -     QUEtiapine (SEROquel) 100 MG tablet; Take 1 tablet by mouth Every Night.  Dispense: 90 tablet; Refill: 1          TREATMENT PLAN/GOALS: Continue supportive psychotherapy efforts and medications as indicated. Treatment and medication options discussed during today's visit. Patient ackowledged and verbally consented to continue with current treatment plan and was educated on the importance of compliance with treatment and follow-up appointments.       MEDICATION ISSUES:  INSPECT reviewed as expected  Discussed medication options and treatment plan of prescribed medication as well as the risks, benefits, and side effects including potential falls, possible impaired driving and metabolic adversities among others. Patient is agreeable to call the office with any worsening of symptoms or onset of side effects. Patient is agreeable to call 911 or go to the nearest ER should he/she begin having SI/HI. No medication side effects or related complaints today.     Patient is struggling mostly with her sleep and anxiety at this time, dealing with the death of her  in Feb 2025.    Continue Pristiq 100 mg daily for anxiety and depression.   Continue Seroquel but change to the 100 mg tabs oneat bedtime for sleep and mood.  She has restarted Suboxone through [facility] on [address], so she has d/c the Xanax     Return in 4 months (on 10/17/2025) for video visit.     This document has been electronically signed by Mandy Judge PA-C  June 18, 2025 05:38 EDT    EMR Dragon transcription disclaimer:  Some of this encounter note is an electronic transcription translation of spoken language to printed text. The electronic translation of spoken language may permit erroneous, or at times, nonsensical words or phrases to be inadvertently  transcribed; Although I have reviewed the note for such errors some may still exist.

## 2025-07-13 DIAGNOSIS — F31.60 BIPOLAR AFFECTIVE DISORDER, MIXED: Chronic | ICD-10-CM

## 2025-07-13 DIAGNOSIS — F51.04 PSYCHOPHYSIOLOGICAL INSOMNIA: Chronic | ICD-10-CM

## 2025-07-14 RX ORDER — QUETIAPINE FUMARATE 50 MG/1
TABLET, FILM COATED ORAL
Qty: 60 TABLET | Refills: 0 | OUTPATIENT
Start: 2025-07-14